# Patient Record
Sex: MALE | Race: WHITE | NOT HISPANIC OR LATINO | Employment: PART TIME | URBAN - METROPOLITAN AREA
[De-identification: names, ages, dates, MRNs, and addresses within clinical notes are randomized per-mention and may not be internally consistent; named-entity substitution may affect disease eponyms.]

---

## 2017-01-20 ENCOUNTER — OFFICE VISIT (OUTPATIENT)
Dept: FAMILY MEDICINE | Facility: CLINIC | Age: 22
End: 2017-01-20
Payer: COMMERCIAL

## 2017-01-20 VITALS
SYSTOLIC BLOOD PRESSURE: 128 MMHG | HEART RATE: 72 BPM | HEIGHT: 68 IN | DIASTOLIC BLOOD PRESSURE: 70 MMHG | BODY MASS INDEX: 20.45 KG/M2 | WEIGHT: 134.94 LBS | TEMPERATURE: 99 F

## 2017-01-20 DIAGNOSIS — G47.00 INSOMNIA, UNSPECIFIED TYPE: Primary | ICD-10-CM

## 2017-01-20 PROCEDURE — 99999 PR PBB SHADOW E&M-EST. PATIENT-LVL III: CPT | Mod: PBBFAC,,, | Performed by: FAMILY MEDICINE

## 2017-01-20 PROCEDURE — 99204 OFFICE O/P NEW MOD 45 MIN: CPT | Mod: S$GLB,,, | Performed by: FAMILY MEDICINE

## 2017-01-20 PROCEDURE — 1159F MED LIST DOCD IN RCRD: CPT | Mod: S$GLB,,, | Performed by: FAMILY MEDICINE

## 2017-01-20 RX ORDER — LORAZEPAM 0.5 MG/1
0.5 TABLET ORAL EVERY 6 HOURS PRN
Qty: 30 TABLET | Refills: 1 | Status: SHIPPED | OUTPATIENT
Start: 2017-01-20 | End: 2017-07-26 | Stop reason: SDUPTHER

## 2017-01-20 NOTE — MR AVS SNAPSHOT
[facility]  [address] [facility], [address]  [facility] [address]  Phone: [phone]  Fax: [phone]                  Renzo Worley   2017 3:40 PM   Office Visit    Description:  Male : 1995   Provider:  Allyssa Stallworth MD   Department:  [facility]           Reason for Visit     Anxiety     Sleeping Problem           Diagnoses this Visit        Comments    Insomnia, unspecified type    -  Primary            To Do List           Future Appointments        Provider Department Dept Phone    3/7/2017 3:00 PM Cynthia Carranza MD [facility] D[facility]atology [phone]      Goals (5 Years of Data)     None       These Medications        Disp Refills Start End    lorazepam (ATIVAN) 0.5 MG tablet 30 tablet 1 2017    Take 1 tablet (0.5 mg total) by mouth every 6 (six) hours as needed for Anxiety. - Oral    Pharmacy: [facility] [address] [place] [address] [address] Ph #: [phone]         [facility] On Call     [facility] On Call Nurse Care Line -  Assistance  Registered nurses in the [facility] On Call Center provide clinical advisement, health education, appointment booking, and other advisory services.  Call for this free service at [phone].             Medications           Message regarding Medications     Verify the changes and/or additions to your medication regime listed below are the same as discussed with your clinician today.  If any of these changes or additions are incorrect, please notify your healthcare provider.        START taking these NEW medications        Refills    lorazepam (ATIVAN) 0.5 MG tablet 1    Sig: Take 1 tablet (0.5 mg total) by mouth every 6 (six) hours as needed for Anxiety.    Class: Print    Route: Oral      STOP taking these medications     azithromycin (ZITHROMAX Z-JAVIER) 250 MG tablet Take 2 tabs po on day 1; then 1 po daily until completed  "   clonazepam (KLONOPIN) 0.5 MG tablet TAKE 1 TABLET BY MOUTH EVERY EVENING    eletriptan (RELPAX) 40 MG tablet Take 1 tablet by mouth for severe migraine, may repeat once in 2 hours if necessary, not to exceed 2 tablets a day    rizatriptan (MAXALT-MLT) 10 MG disintegrating tablet DISSOLVE 1 TABLET BY MOUTH AS DIRECTED    naproxen (NAPROSYN) 500 MG tablet Take 1 tablet (500 mg total) by mouth 2 (two) times daily with meals. 1 Tablet Oral Twice a day    promethazine (PHENERGAN) 12.5 MG tablet Take 1 tablet (12.5 mg total) by mouth 4 (four) times daily as needed for Nausea.    sertraline (ZOLOFT) 50 MG tablet Take 1/2 tablet daily for one week, then one daily           Verify that the below list of medications is an accurate representation of the medications you are currently taking.  If none reported, the list may be blank. If incorrect, please contact your healthcare provider. Carry this list with you in case of emergency.           Current Medications     lorazepam (ATIVAN) 0.5 MG tablet Take 1 tablet (0.5 mg total) by mouth every 6 (six) hours as needed for Anxiety.           Clinical Reference Information           Vital Signs - Last Recorded  Most recent update: 1/20/2017  3:40 PM by Alexia Pacheco MA    BP Pulse Temp    128/70 (BP Location: Left arm, Patient Position: Sitting, BP Method: Manual) 72 98.6 °F (37 °C) (Oral)    Ht Wt BMI    5' 7.75" (1.721 m) 61.2 kg (134 lb 14.7 oz) 20.67 kg/m2      Blood Pressure          Most Recent Value    BP  128/70      Allergies as of 1/20/2017     No Known Drug Allergies      Immunizations Administered on Date of Encounter - 1/20/2017     None      Smoking Cessation     If you would like to quit smoking:   You may be eligible for free services if you are a [place] resident and started smoking cigarettes before September 1, 1988.  Call the [employer]oking [employer]essation [employer] (S[employer]) toll free at (835) 775-8962 or (862) 727-9710.   Call 5-800-QUIT-NOW if you do not meet the " above criteria.

## 2017-01-23 NOTE — PROGRESS NOTES
Subjective:       Patient ID: Renzo Worley is a 21 y.o. male.    Chief Complaint: Anxiety and Sleeping Problem  pt working 2 jobs trying to make some career changes  Having difficulty sleeping at night. Sometimes doesn't  Sleep at all..   HPIsee above  Review of Systems   Constitutional: Positive for fatigue.   HENT: Negative.    Eyes: Negative.    Respiratory: Negative.    Cardiovascular: Negative.    Gastrointestinal: Negative.    Endocrine: Negative.    Genitourinary: Negative.    Musculoskeletal: Negative.    Allergic/Immunologic: Negative.    Neurological: Negative.    Hematological: Negative.    Psychiatric/Behavioral: Positive for sleep disturbance. The patient is nervous/anxious.        Objective:      Physical Exam   Constitutional: He is oriented to person, place, and time. He appears well-developed and well-nourished. No distress.   HENT:   Head: Normocephalic and atraumatic.   Right Ear: External ear normal.   Left Ear: External ear normal.   Nose: Nose normal.   Mouth/Throat: Oropharynx is clear and moist.   Eyes: Conjunctivae and EOM are normal. Pupils are equal, round, and reactive to light.   Neck: Normal range of motion. Neck supple. No JVD present. No tracheal deviation present. No thyromegaly present.   Cardiovascular: Normal rate, regular rhythm, normal heart sounds and intact distal pulses.  Exam reveals no gallop and no friction rub.    No murmur heard.  Pulmonary/Chest: Breath sounds normal. No respiratory distress. He has no wheezes. He has no rales. He exhibits no tenderness.   Abdominal: Soft. Bowel sounds are normal. He exhibits no distension and no mass. There is no tenderness.   Lymphadenopathy:     He has no cervical adenopathy.   Neurological: He is alert and oriented to person, place, and time. He has normal reflexes. He displays normal reflexes. No cranial nerve deficit. He exhibits normal muscle tone. Coordination normal.   Skin: Skin is warm and dry. No rash noted. He is not  diaphoretic. No erythema. No pallor.   Psychiatric: He has a normal mood and affect. His speech is normal and behavior is normal. Judgment and thought content normal. He is not actively hallucinating. Cognition and memory are normal. He is attentive.   Nursing note and vitals reviewed.      Assessment:       1. Insomnia, unspecified type     2.     Situational disturbance  Plan:     Pt counselled about abuse potential of meds.\   see med card dated 1-20-17  Recommend pt use every other day only.  Till sleep cycle improves.  Counseled x 20 minutes regarding med use and side effects

## 2017-07-26 ENCOUNTER — HOSPITAL ENCOUNTER (OUTPATIENT)
Dept: RADIOLOGY | Facility: HOSPITAL | Age: 22
Discharge: HOME | End: 2017-07-26
Attending: FAMILY MEDICINE
Payer: COMMERCIAL

## 2017-07-26 ENCOUNTER — OFFICE VISIT (OUTPATIENT)
Dept: SPORTS MEDICINE | Facility: CLINIC | Age: 22
End: 2017-07-26
Payer: COMMERCIAL

## 2017-07-26 VITALS — WEIGHT: 134 LBS | TEMPERATURE: 99 F | BODY MASS INDEX: 20.31 KG/M2 | HEIGHT: 68 IN

## 2017-07-26 DIAGNOSIS — M25.561 RIGHT KNEE PAIN, UNSPECIFIED CHRONICITY: ICD-10-CM

## 2017-07-26 DIAGNOSIS — M25.551 RIGHT HIP PAIN: ICD-10-CM

## 2017-07-26 DIAGNOSIS — M54.31 SCIATICA OF RIGHT SIDE: Primary | ICD-10-CM

## 2017-07-26 PROCEDURE — 99203 OFFICE O/P NEW LOW 30 MIN: CPT | Mod: S$GLB,,, | Performed by: FAMILY MEDICINE

## 2017-07-26 PROCEDURE — 73502 X-RAY EXAM HIP UNI 2-3 VIEWS: CPT | Mod: TC,PO,RT

## 2017-07-26 PROCEDURE — 99999 PR PBB SHADOW E&M-EST. PATIENT-LVL III: CPT | Mod: PBBFAC,,, | Performed by: FAMILY MEDICINE

## 2017-07-26 PROCEDURE — 73502 X-RAY EXAM HIP UNI 2-3 VIEWS: CPT | Mod: 26,RT,, | Performed by: RADIOLOGY

## 2017-07-26 NOTE — PROGRESS NOTES
Renzo Worley, a 22 y.o. male, is here for evaluation of RIGHT hip.     New patient.     HISTORY OF PRESENT ILLNESS   Location: anterior thigh, right  Onset: insidious, chronic  Palliative:    Relative rest   Oral analgesics     Provocative:   ADLs  Golf activities   Prior: none  Progression: plateau discomfort   Quality:    Sharp pain  Radiation: post thigh and leg  Severity: per nursing documentation  Timing: intermittent with use  Trauma: none     Review of systems (ROS):  A 10+ review of systems was performed with pertinent positives and negatives noted above in the history of present illness. Other systems were negative unless otherwise specified.      PHYSICAL EXAMINATION  General:  The patient is alert and oriented x 3.  Mood is pleasant.  Observation of ears, eyes and nose reveal no gross abnormalities.  HEENT: NCAT, sclera nonicteric  Lungs: Respirations are equal and unlabored.   Gait is coordinated. Patient can toe walk and heel walk without difficulty.    HIP/PELVIS EXAMINATION    Observation/Inspection  Gait:   Nonantalgic   Alignment:  Neutral   Scars:   None   Muscle atrophy: None   Effusion:  None   Warmth:  None   Discoloration:   None   Leg lengths:   Equal   Pelvis:   Level     Tenderness/Crepitus (T/C):      T / C  Trochanteric bursa   - / -  Piriformis    - / -  SI joint    - / -  Psoas tendon   - / -  Rectus insertion  - / -  Adductor insertion  - / -  Pubic symphysis  - / -    ROM: (* = pain)    Flexion:      120 degrees  External rotation:   40 degrees  Internal rotation with axial load:  30 degrees  Internal rotation without axial load:  40 degrees  Abduction:    45 degrees  Adduction:     20 degrees    Special Tests:  Pain w/ forced internal rotation (FADIR):  -   Pain w/ forced external rotation (IVY):  -   Circumduction test:     -  Stinchfield test:     -   Log roll:       -   Snapping hip (internal):    -   Sit-up pain:      -   Resisted sit-up pain:     -   Resisted sit-up with  adductor contraction pain:  -   Step-down test:     +  Trendelenburg test:     -  Bridge test      +     Extremity Neuro-vascular Examination:   Sensation:  Grossly intact to light touch all dermatomal regions.   Motor Function:  Fully intact motor function at hip, knee, foot and ankle    DTRs;  quadriceps and  achilles 2+.  No clonus and downgoing Babinski.    Vascular status:  DP and PT pulses 2+, brisk capillary refill, symmetric.    Skin:  intact, compartments soft.    Other Findings:    ASSESSMENT & PLAN  Assessment:   #1 overuse syndrome   W/ Sciatic neuropathy   W/ SI dysfunction, right    No evidence of vascular pathology    Imaging studies reviewed:   X-ray pelvis and hip, right 17.07    Plan:    We discussed the importance of appropriate diet, weight, and regular exercise including quadriceps strengthening     We discussed options including:  #1 watchful waiting  #2 physical therapy aimed at:   Core stability   RoM hip   Strengthening quadriceps   Gait training   #3 injection therapy:   CSI GTB    Right,     Left,    CSI iaHip    Right,     Left,    Orthobiologics   #4 MRI for further evaluation   #5 further evaluation of the lumbar spine     The patient chooses #2    Pain management: handout given  Bracing:   Physical therapy: fPT, @ [facility], begin as above  Activity (e.g. sports, work) restrictions: OOGolf until f/u   school/vocation: daryn work, semi pro golf    Follow up in 4-5 w  A/e fPT  Ineffective-->MRI pelvis vs. CSI SI right  Should symptoms worsen or fail to resolve, consider:  Revisiting the above options

## 2017-08-02 ENCOUNTER — CLINICAL SUPPORT (OUTPATIENT)
Dept: REHABILITATION | Facility: HOSPITAL | Age: 22
End: 2017-08-02
Attending: FAMILY MEDICINE
Payer: COMMERCIAL

## 2017-08-02 DIAGNOSIS — M54.31 RIGHT SIDED SCIATICA: ICD-10-CM

## 2017-08-02 DIAGNOSIS — M25.551 RIGHT HIP PAIN: Primary | ICD-10-CM

## 2017-08-02 PROCEDURE — 97161 PT EVAL LOW COMPLEX 20 MIN: CPT

## 2017-08-02 NOTE — PROGRESS NOTES
Physical Therapy Evaluation    Name: Renzo Worley  Clinic Number: [id number]      Medical Diagnosis:   Encounter Diagnoses   Name Primary?    Right hip pain Yes    Right sided sciatica      PT Diagnosis: R hip pain, R sided sciatica  Physician: Quentin Warren, *  Treatment Orders: PT Eval and Treat    Past Medical History:   Diagnosis Date    Concussion without loss of consciousness 2009    Right sided sciatica 8/2/2017     No current outpatient prescriptions on file.     No current facility-administered medications for this visit.      Review of patient's allergies indicates:   Allergen Reactions    No known drug allergies        Precautions: none    Evaluation Date: 08/02/2017  Visit # authorized: 1/20  Authorization period: 12/31/2017  Plan of care Expiration: 8/2/2017    Subjective   Onset/RAVINDER: gradual    Primary concern/ Chief complaints:    Renzo is a 22 y.o. male that presents to [facility] secondary to hip pain and sciatica on his R side. Reports that he was working in [employer], playing golf and overtraining at the gym. Reports that at work, he would lift 60lbs of materials up a flight of stairs repeatedly, which is a large percentage of his body weight. Pt reports that he has been stretching, but is still getting this pain. Pt has not had any injections to the hip. Pt was taking anti-inflammatories before he saw his MD but tries to go without. Injury/surgery occurred on 2 months ago. Reports that he has had some worsening in his pain, but the last 3 days have been pretty good.  X-ray was taken and revealed no abnormalities. Previous treatment included none. Pt reports that overuse increases right hip pain and reports right hip pain at worst is a 6 on the VAS. Pt uses rest, ice, stretching to control right hip pain symptoms. Pt has a decrease ability to perform ADLs such as playing golf, going up and down stairs, long duration standing, running. 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 Pt also plays semi-pro golf and is unable to participate fully due to hip pain with his swing. Pt reports mild intermittent numbness and tingling in the R hip and hamstring region. No cultural, environmental, or spiritual barriers identified to treatment or learning.    Occupation: [employer], [employer]  Pain Scale: Renzo rates pain on a scale of 0-10 to be 6 at worst; 1 currently; n/a at best .    Patient Goals: Pt would like to decrease pain and increase function so he can return to pain-free completion of normal daily activities.      Objective     Observation: ambulates independently    Posture: WNL    Hip Range of Motion:   Left Active Left Passive Right Active  Right Passive NORM   Flexion 130 135 130 135  100-120 deg   Abduction 50 50 50 50  30-50 deg   Extension 12 15 12 15  10-15 deg   Ext. Rotation 45 50 45 50  40-60 deg   Int. Rotation 30 35 30 35  30-40 deg     Lower Extremity Strength  Left LE  Right LE    Knee extension: 5/5 Knee extension:  *pain in R low back 4+/5   Knee flexion: 5/5 Knee flexion: 5/5   Hip flexion: 5/5 Hip flexion: 5/5   Hip extension:  4+/5 Hip extension: 4+/5   Hip abduction: 5/5 Hip abduction: 5/5   Hip adduction: 5/5 Hip adduction 5/5   Ankle dorsiflexion: 5/5 Ankle dorsiflexion: 5/5   Ankle plantarflexion: 5/5 Ankle plantarflexion: 5/5         Special Tests:   Bridge Test: -    Flexibility: decreased in R hamstrings    Popliteal Angle: R = -50 degrees ; L = -15 degrees [ norm 0 to -20 degrees ]   Flexion-adduction test: + able to cross over midline    Joint Mobility: WNL B hips   Clicking with PROM?: no    Palpation: ttp to R piriformis    Sensation: WNL    Edema: none    Outcome measures:   FOTO hip: 37% impaired    G-code Current:   (20% < 40%) Impaired, limited, or restricted    G-code Goal:   (20% < 40%) Impaired, Limited, or restricted    PT Evaluation Completed?  Yes  Discussed Plan of Care with patient: Yes    TREATMENT:  Renzo received therapeutic exercises to develop strength and endurance, flexibility for 15 minutes including: Hamstring stretches, piriformis stretches, planks and side planks (HEP2go code = [id number])    Instructed pt. Regarding: Proper technique with all exercises. Pt demo good understanding of the education provided. Renzo demonstrated good return demonstration of activities.  Pt/family was provided educational information, including: role of PT, goals for PT, scheduling - pt verbalized understanding. Discussed insurance limitations with pt.     Pt has no cultural, educational or language barriers to learning provided.    Assessment     This is a 22 y.o. male referred to outpatient physical therapy and presents with a medical diagnosis of right hip pain and demonstrates limitations as described in the problem list. Pt will benefit from physcial therapy services in order to maximize pain free and/or functional use of right hip. The following goals were discussed with the patient and patient is in agreement with them as to be addressed in the treatment plan. Pt was given a HEP consisting of hamstring stretches, piriformis stretches, planks and side planks (HEP2go code = [id number]). Pt verbally understood the instructions as they were given and demonstrated proper form and technique during therapy. Pt was advise to perform these exercises free of pain, and to stop performing them if pain occurs.     Patient History Examination Clinical Presentation Clinical Decision Making   Comorbidities:  none    Personal Factors:  none       Activity and Participation Restriction:  Mobility  General tasks and demands    Body Systems:  Musculoskeletal    Body Regions:  Lower Extremities Stable and uncomplicated   Low            Medical necessity is demonstrated by the following IMPAIRMENTS/PROBLEM LIST:   1)Increase in pain level limiting function   2)Decreased range of motion  limiting function   3)Decreased strength limiting function   4)Lack of HEP    Anticipated barriers to physical therapy: none    Pt's spiritual, cultural and educational needs considered and pt agreeable to plan of care and goals as stated below:     GOALS: Short Term Goals:  3 weeks  1. Report decreased right hip pain  <   / =  0  /10  to increase tolerance for completion of ADLs  2. Increased MMT for hip flexion to 5/5  to increase tolerance for ADL and work activities.  3. Increased MMT for hip extension to 5/5 to increase tolerance for ADL and work activities.  4. Pt to report a decreased amount of difficulty with playing golf in order to demonstrate improvements in ADL tolerance  5. Pt to tolerate HEP to improve ROM and independence with ADL's    Long Term Goals: 6 weeks  1. Demonstrate within 20 degrees of 0 on HS flexibility testing in order to demonstrate improvements in R HS flexibility and AROM  2. Increased MMT  for hip abduction 5/5 to increase tolerance for ADL and work activities.  3. Demonstrate equal hip flexion in standing in order to demonstrate improvements in functional AROM.  4. Pt will report at CJ level (20-40% impaired) on FOTO to demonstrate increase in LE function with every day tasks.   5. Pt to be Independent with HEP to improve ROM and independence with ADL's      Plan     Pt will be treated by physical therapy 1-3 times a week for 6 weeks for Pt Education, HEP, therapeutic exercises, neuromuscular re-education, joint mobilizations, modalities prn to achieve established goals. Renzo may at times be seen by a PTA as part of the Rehab Team.     Cont PT for 6 weeks.     Micaela Avalos, ALAN  08/02/2017    I certify the need for these services furnished under this plan of treatment and while under my care.    ______________________________ Physician/Referring Practitioner  Date of Signature

## 2017-08-03 ENCOUNTER — PATIENT MESSAGE (OUTPATIENT)
Dept: SPORTS MEDICINE | Facility: CLINIC | Age: 22
End: 2017-08-03

## 2017-08-03 DIAGNOSIS — M54.30 SCIATICA, UNSPECIFIED LATERALITY: Primary | ICD-10-CM

## 2017-08-04 ENCOUNTER — TELEPHONE (OUTPATIENT)
Dept: SPORTS MEDICINE | Facility: CLINIC | Age: 22
End: 2017-08-04

## 2017-08-04 RX ORDER — MELOXICAM 15 MG/1
15 TABLET ORAL DAILY
Qty: 15 TABLET | Refills: 0 | Status: SHIPPED | OUTPATIENT
Start: 2017-08-04 | End: 2017-10-09 | Stop reason: SDUPTHER

## 2017-08-04 NOTE — TELEPHONE ENCOUNTER
----- Message from Zuly Martinez sent at 8/4/2017  9:27 AM CDT -----  Contact: [email or web address]#[phone]  Patient mother would like him to be seen today patient is having right hip pain.

## 2017-08-04 NOTE — TELEPHONE ENCOUNTER
Spoke c pt's mother.     Informed pt's mother Dr. Warrne is out of the office today.     Pt's mother stated she needs to be with pt at next visit.     Scheduled pt 08/07/17 @ 9:30.     Informed pt's mother Dr. Warren has sent meloxicam to pharmacy.     Pt's mother stated she has a rx for meloxicam & has been giving it to pt & pt has not had relief. Pt's mother requested rx for flexeril & another medication.     Informed pt's mother those medications are not something prescribed by Dr. Warren.     Pt's mother understood.

## 2017-08-04 NOTE — TELEPHONE ENCOUNTER
Left voicemail.     Instructed pt to return call to [facility].     ==    Meloxicam & referral to spine clinic ordered by Dr. Warren.

## 2017-08-04 NOTE — PLAN OF CARE
Physical Therapy Evaluation    Name: Renzo Worley  Clinic Number: [id number]      Medical Diagnosis:   Encounter Diagnoses   Name Primary?    Right hip pain Yes    Right sided sciatica      PT Diagnosis: R hip pain, R sided sciatica  Physician: Quentin Warren, *  Treatment Orders: PT Eval and Treat    Past Medical History:   Diagnosis Date    Concussion without loss of consciousness 2009    Right sided sciatica 8/2/2017     No current outpatient prescriptions on file.     No current facility-administered medications for this visit.      Review of patient's allergies indicates:   Allergen Reactions    No known drug allergies        Precautions: none    Evaluation Date: 08/02/2017  Visit # authorized: 1/20  Authorization period: 12/31/2017  Plan of care Expiration: 8/2/2017    Subjective   Onset/RAVINDER: gradual    Primary concern/ Chief complaints:    Renzo is a 22 y.o. male that presents to [facility] secondary to hip pain and sciatica on his R side. Reports that he was working in [employer], playing golf and overtraining at the gym. Reports that at work, he would lift 60lbs of materials up a flight of stairs repeatedly, which is a large percentage of his body weight. Pt reports that he has been stretching, but is still getting this pain. Pt has not had any injections to the hip. Pt was taking anti-inflammatories before he saw his MD but tries to go without. Injury/surgery occurred on 2 months ago. Reports that he has had some worsening in his pain, but the last 3 days have been pretty good.  X-ray was taken and revealed no abnormalities. Previous treatment included none. Pt reports that overuse increases right hip pain and reports right hip pain at worst is a 6 on the VAS. Pt uses rest, ice, stretching to control right hip pain symptoms. Pt has a decrease ability to perform ADLs such as playing golf, going up and down stairs, long duration standing, running. 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 Pt also plays semi-pro golf and is unable to participate fully due to hip pain with his swing. Pt reports mild intermittent numbness and tingling in the R hip and hamstring region. No cultural, environmental, or spiritual barriers identified to treatment or learning.    Occupation: [employer]  Pain Scale: Renzo rates pain on a scale of 0-10 to be 6 at worst; 1 currently; n/a at best .    Patient Goals: Pt would like to decrease pain and increase function so he can return to pain-free completion of normal daily activities.      Objective     Observation: ambulates independently    Posture: WNL    Hip Range of Motion:   Left Active Left Passive Right Active  Right Passive NORM   Flexion 130 135 130 135  100-120 deg   Abduction 50 50 50 50  30-50 deg   Extension 12 15 12 15  10-15 deg   Ext. Rotation 45 50 45 50  40-60 deg   Int. Rotation 30 35 30 35  30-40 deg     Lower Extremity Strength  Left LE  Right LE    Knee extension: 5/5 Knee extension:  *pain in R low back 4+/5   Knee flexion: 5/5 Knee flexion: 5/5   Hip flexion: 5/5 Hip flexion: 5/5   Hip extension:  4+/5 Hip extension: 4+/5   Hip abduction: 5/5 Hip abduction: 5/5   Hip adduction: 5/5 Hip adduction 5/5   Ankle dorsiflexion: 5/5 Ankle dorsiflexion: 5/5   Ankle plantarflexion: 5/5 Ankle plantarflexion: 5/5         Special Tests:   Bridge Test: -    Flexibility: decreased in R hamstrings    Popliteal Angle: R = -50 degrees ; L = -15 degrees [ norm 0 to -20 degrees ]   Flexion-adduction test: + able to cross over midline    Joint Mobility: WNL B hips   Clicking with PROM?: no    Palpation: ttp to R piriformis    Sensation: WNL    Edema: none    Outcome measures:   FOTO hip: 37% impaired    G-code Current:   (20% < 40%) Impaired, limited, or restricted    G-code Goal:   (20% < 40%) Impaired, Limited, or restricted    PT Evaluation Completed?  Yes  Discussed Plan of Care with patient: Yes    TREATMENT:  Renzo received therapeutic exercises to develop strength and endurance, flexibility for 15 minutes including: Hamstring stretches, piriformis stretches, planks and side planks (HEP2go code = [id number])    Instructed pt. Regarding: Proper technique with all exercises. Pt demo good understanding of the education provided. Renzo demonstrated good return demonstration of activities.  Pt/family was provided educational information, including: role of PT, goals for PT, scheduling - pt verbalized understanding. Discussed insurance limitations with pt.     Pt has no cultural, educational or language barriers to learning provided.    Assessment     This is a 22 y.o. male referred to outpatient physical therapy and presents with a medical diagnosis of right hip pain and demonstrates limitations as described in the problem list. Pt will benefit from physcial therapy services in order to maximize pain free and/or functional use of right hip. The following goals were discussed with the patient and patient is in agreement with them as to be addressed in the treatment plan. Pt was given a HEP consisting of hamstring stretches, piriformis stretches, planks and side planks (HEP2go code = [id number]). Pt verbally understood the instructions as they were given and demonstrated proper form and technique during therapy. Pt was advise to perform these exercises free of pain, and to stop performing them if pain occurs.     Patient History Examination Clinical Presentation Clinical Decision Making   Comorbidities:  none    Personal Factors:  none       Activity and Participation Restriction:  Mobility  General tasks and demands    Body Systems:  Musculoskeletal    Body Regions:  Lower Extremities Stable and uncomplicated   Low            Medical necessity is demonstrated by the following IMPAIRMENTS/PROBLEM LIST:   1)Increase in pain level limiting function   2)Decreased range of motion  limiting function   3)Decreased strength limiting function   4)Lack of HEP    Anticipated barriers to physical therapy: none    Pt's spiritual, cultural and educational needs considered and pt agreeable to plan of care and goals as stated below:     GOALS: Short Term Goals:  3 weeks  1. Report decreased right hip pain  <   / =  0  /10  to increase tolerance for completion of ADLs  2. Increased MMT for hip flexion to 5/5  to increase tolerance for ADL and work activities.  3. Increased MMT for hip extension to 5/5 to increase tolerance for ADL and work activities.  4. Pt to report a decreased amount of difficulty with playing golf in order to demonstrate improvements in ADL tolerance  5. Pt to tolerate HEP to improve ROM and independence with ADL's    Long Term Goals: 6 weeks  1. Demonstrate within 20 degrees of 0 on HS flexibility testing in order to demonstrate improvements in R HS flexibility and AROM  2. Increased MMT  for hip abduction 5/5 to increase tolerance for ADL and work activities.  3. Demonstrate equal hip flexion in standing in order to demonstrate improvements in functional AROM.  4. Pt will report at CJ level (20-40% impaired) on FOTO to demonstrate increase in LE function with every day tasks.   5. Pt to be Independent with HEP to improve ROM and independence with ADL's      Plan     Pt will be treated by physical therapy 1-3 times a week for 6 weeks for Pt Education, HEP, therapeutic exercises, neuromuscular re-education, joint mobilizations, modalities prn to achieve established goals. Renzo may at times be seen by a PTA as part of the Rehab Team.     Cont PT for 6 weeks.     Micaela Avalos, ALAN  08/02/2017    I certify the need for these services furnished under this plan of treatment and while under my care.    ______________________________ Physician/Referring Practitioner  Date of Signature

## 2017-08-28 ENCOUNTER — PATIENT MESSAGE (OUTPATIENT)
Dept: SPORTS MEDICINE | Facility: CLINIC | Age: 22
End: 2017-08-28

## 2017-09-18 ENCOUNTER — PATIENT MESSAGE (OUTPATIENT)
Dept: FAMILY MEDICINE | Facility: CLINIC | Age: 22
End: 2017-09-18

## 2017-09-19 ENCOUNTER — OFFICE VISIT (OUTPATIENT)
Dept: FAMILY MEDICINE | Facility: CLINIC | Age: 22
End: 2017-09-19
Payer: COMMERCIAL

## 2017-09-19 VITALS
SYSTOLIC BLOOD PRESSURE: 120 MMHG | HEART RATE: 62 BPM | HEIGHT: 69 IN | WEIGHT: 132.06 LBS | DIASTOLIC BLOOD PRESSURE: 68 MMHG | BODY MASS INDEX: 19.56 KG/M2

## 2017-09-19 DIAGNOSIS — F41.9 ANXIETY: ICD-10-CM

## 2017-09-19 DIAGNOSIS — G89.29 CHRONIC RIGHT-SIDED LOW BACK PAIN WITH RIGHT-SIDED SCIATICA: Primary | ICD-10-CM

## 2017-09-19 DIAGNOSIS — M54.41 CHRONIC RIGHT-SIDED LOW BACK PAIN WITH RIGHT-SIDED SCIATICA: Primary | ICD-10-CM

## 2017-09-19 PROCEDURE — 99999 PR PBB SHADOW E&M-EST. PATIENT-LVL III: CPT | Mod: PBBFAC,,, | Performed by: FAMILY MEDICINE

## 2017-09-19 PROCEDURE — 3008F BODY MASS INDEX DOCD: CPT | Mod: S$GLB,,, | Performed by: FAMILY MEDICINE

## 2017-09-19 PROCEDURE — 96372 THER/PROPH/DIAG INJ SC/IM: CPT | Mod: S$GLB,,, | Performed by: FAMILY MEDICINE

## 2017-09-19 PROCEDURE — 99214 OFFICE O/P EST MOD 30 MIN: CPT | Mod: 25,S$GLB,, | Performed by: FAMILY MEDICINE

## 2017-09-19 RX ORDER — CHLORZOXAZONE 500 MG/1
500 TABLET ORAL 4 TIMES DAILY PRN
Qty: 30 TABLET | Refills: 3 | Status: SHIPPED | OUTPATIENT
Start: 2017-09-19 | End: 2017-09-29

## 2017-09-19 RX ORDER — KETOROLAC TROMETHAMINE 30 MG/ML
15 INJECTION, SOLUTION INTRAMUSCULAR; INTRAVENOUS EVERY 6 HOURS
Qty: 0.5 ML | Refills: 0 | Status: SHIPPED | OUTPATIENT
Start: 2017-09-19 | End: 2017-09-19

## 2017-09-19 RX ORDER — SERTRALINE HYDROCHLORIDE 50 MG/1
50 TABLET, FILM COATED ORAL DAILY
Qty: 30 TABLET | Refills: 11 | Status: ON HOLD | OUTPATIENT
Start: 2017-09-19 | End: 2017-11-20

## 2017-09-19 RX ORDER — KETOROLAC TROMETHAMINE 30 MG/ML
15 INJECTION, SOLUTION INTRAMUSCULAR; INTRAVENOUS
Status: DISCONTINUED | OUTPATIENT
Start: 2017-09-19 | End: 2017-09-19

## 2017-09-19 RX ORDER — KETOROLAC TROMETHAMINE 30 MG/ML
30 INJECTION, SOLUTION INTRAMUSCULAR; INTRAVENOUS
Status: COMPLETED | OUTPATIENT
Start: 2017-09-19 | End: 2017-09-19

## 2017-09-19 RX ADMIN — KETOROLAC TROMETHAMINE 30 MG: 30 INJECTION, SOLUTION INTRAMUSCULAR; INTRAVENOUS at 11:09

## 2017-09-19 NOTE — PROGRESS NOTES
Two patient identifiers used, allergies reviewed,  order verified and administered to . Toradol 15 mg administered as ordered. Administered to LUOQ gluteus. Pt tolerated injection well, no swelling, redness, or bruising noted at injection site. Pt advised to remain in clinic 15 minutes following injection for observation, verbalizes understanding.

## 2017-09-19 NOTE — PROGRESS NOTES
Subjective:       Patient ID: Renzo Worley is a 22 y.o. male.    Chief Complaint: Anxiety and Hip Pain (muscular)    Disclaimer: This note has been generated using voice-recognition software. There may be typographical errors that have been missed during proof-reading    21 yo presents today for evaluation of recurrent low back pain, present for over six months.  Pain is localized to right lower back with radiation to outer aspect of right leg.  Pain is about7/10, presently taking Flexeril qhs and Mobic with minimal relief.  He denies associated weakness or numbness.  Pt has been evaluated by both Sports Medicine and outside Chiropractor  He is also here with recurrent symptoms of anxiety and depression, which he blames on his low back pain.  He is unable to pursue activities that he enjoys.  He is presently playing golf and lifting weights.  He also has to lift heavy bags at work.        Anxiety   Symptoms include nervous/anxious behavior. Patient reports no suicidal ideas.       Hip Pain    Pertinent negatives include no numbness.     Review of Systems   Constitutional: Negative for activity change, fatigue and unexpected weight change.   Musculoskeletal: Positive for back pain. Negative for gait problem and neck pain.   Neurological: Negative for weakness and numbness.   Psychiatric/Behavioral: Negative for agitation, self-injury, sleep disturbance and suicidal ideas. The patient is nervous/anxious.        Objective:      Physical Exam   Constitutional: He appears well-developed and well-nourished. No distress.   Musculoskeletal:   Tender to palpation over right lumbar spine at about L4-L5  positive SLR on right   Neurological:   Reflex Scores:       Patellar reflexes are 0 on the right side and 0 on the left side.       Achilles reflexes are 2+ on the right side and 2+ on the left side.  Psychiatric: His speech is normal and behavior is normal. Judgment and thought content normal. His mood appears anxious. His  affect is not angry, not blunt and not inappropriate. Cognition and memory are normal. He expresses no suicidal ideation.       Assessment:       1. Chronic right-sided low back pain with right-sided sciatica    2. Anxiety        Plan:       1.  Lumbar MRI, consult Pain Management  2.  May continue Flexeril qhs, Parfon Forte as needed during day  3.  Continue Mobic, Toradol IM today  4.  Start Zoloft with f/u in 2-3 weeks

## 2017-09-20 ENCOUNTER — TELEPHONE (OUTPATIENT)
Dept: PAIN MEDICINE | Facility: CLINIC | Age: 22
End: 2017-09-20

## 2017-09-20 NOTE — TELEPHONE ENCOUNTER
Contacted and left message for pt regarding appointment on 9/20/17. Pt was informed via voicemail Dr. Rivas has a  Surgery scheduled for 12:30 , and unfortunately the appointment was scheduled in error.

## 2017-09-23 ENCOUNTER — HOSPITAL ENCOUNTER (OUTPATIENT)
Dept: RADIOLOGY | Facility: HOSPITAL | Age: 22
Discharge: HOME | End: 2017-09-23
Attending: FAMILY MEDICINE
Payer: COMMERCIAL

## 2017-09-23 DIAGNOSIS — G89.29 CHRONIC RIGHT-SIDED LOW BACK PAIN WITH RIGHT-SIDED SCIATICA: ICD-10-CM

## 2017-09-23 DIAGNOSIS — M54.41 CHRONIC RIGHT-SIDED LOW BACK PAIN WITH RIGHT-SIDED SCIATICA: ICD-10-CM

## 2017-09-23 PROCEDURE — 72148 MRI LUMBAR SPINE W/O DYE: CPT | Mod: 26,,, | Performed by: RADIOLOGY

## 2017-09-23 PROCEDURE — 72148 MRI LUMBAR SPINE W/O DYE: CPT | Mod: TC

## 2017-09-26 ENCOUNTER — OFFICE VISIT (OUTPATIENT)
Dept: PAIN MEDICINE | Facility: CLINIC | Age: 22
End: 2017-09-26
Attending: ANESTHESIOLOGY
Payer: COMMERCIAL

## 2017-09-26 VITALS
HEIGHT: 69 IN | TEMPERATURE: 98 F | BODY MASS INDEX: 18.77 KG/M2 | WEIGHT: 126.75 LBS | OXYGEN SATURATION: 99 % | DIASTOLIC BLOOD PRESSURE: 72 MMHG | RESPIRATION RATE: 18 BRPM | HEART RATE: 74 BPM | SYSTOLIC BLOOD PRESSURE: 112 MMHG

## 2017-09-26 DIAGNOSIS — M47.26 OSTEOARTHRITIS OF SPINE WITH RADICULOPATHY, LUMBAR REGION: Primary | ICD-10-CM

## 2017-09-26 DIAGNOSIS — M51.26 HERNIATED LUMBAR INTERVERTEBRAL DISC: ICD-10-CM

## 2017-09-26 PROCEDURE — 99245 OFF/OP CONSLTJ NEW/EST HI 55: CPT | Mod: S$GLB,,, | Performed by: ANESTHESIOLOGY

## 2017-09-26 PROCEDURE — 99999 PR PBB SHADOW E&M-EST. PATIENT-LVL III: CPT | Mod: PBBFAC,,, | Performed by: ANESTHESIOLOGY

## 2017-09-26 NOTE — PROGRESS NOTES
Subjective:      Patient ID: Renzo Worley is a 22 y.o. male.    Chief Complaint: Back Pain (Dr. Area)    Referred by: Rene Monreal MD     Pain Scales  Best:/10  Worst:  Usually:  Today: 7/10    Back Pain   Pertinent negatives include no chest pain, fever, headaches or weight loss.     This is a consult for right buttock area radiating to the right lower extremity.  He is a 22-year-old male who has been suffering with right buttock and right lower extremity pain for approximately 6 months.  His recently worsened significantly.  He has tried physical therapy 6 weeks ago without significant help.  Chiropractic manipulation helps for about 1 day.  He did not receive any epidural injections but total helped him temporarily when given once before.  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  He also plays golf.  All of these activities seem to aggravate the pain.  He denied having any bowel or bladder incontinence but he said this uncomfortable to have a bowel movement as it aggravates his pain.  Sitting and driving aggravates his pain.  His pain is in a classic S1 distribution.  Pain is a dull ache and sometimes sharp.  He had a new MRI done a few days ago showed herniated disc at the L5-S1.  We discussed the images with the patient and his mother.    Past Medical History:   Diagnosis Date    Concussion without loss of consciousness 2009    Right sided sciatica 8/2/2017       History reviewed. No pertinent surgical history.    Review of patient's allergies indicates:   Allergen Reactions    No known drug allergies        Current Outpatient Prescriptions   Medication Sig Dispense Refill    chlorzoxazone (PARAFON FORTE) 500 mg Tab Take 1 tablet (500 mg total) by mouth 4 (four) times daily as needed. 30 tablet 3    meloxicam (MOBIC) 15 MG tablet Take 1 tablet (15 mg total) by mouth once daily. 15 tablet 0    sertraline (ZOLOFT) 50 MG tablet Take 1 tablet (50 mg total) by mouth  once daily. 30 tablet 11     No current facility-administered medications for this visit.        Family History   Problem Relation Age of Onset    Hypertension Mother     Migraines Mother     Arthritis Mother     Migraines Sister     Migraines Brother     Multiple sclerosis Maternal Uncle     Cerebral palsy Paternal Aunt     Stroke Maternal Grandfather     Cancer Maternal Grandfather     Cancer Paternal Grandmother     Coronary artery disease Paternal Grandfather     Ulcers Paternal Grandfather        Social History     Social History    Marital status: Single     Spouse name: N/A    Number of children: N/A    Years of education: N/A     Occupational History    Not on file.     Social History Main Topics    Smoking status: Current Every Day Smoker     Packs/day: 1.00     Types: Cigarettes    Smokeless tobacco: Never Used    Alcohol use 0.6 oz/week     1 Shots of liquor per week      Comment: occasional    Drug use: No    Sexual activity: Yes     Other Topics Concern    Not on file     Social History Narrative    No narrative on file           Review of Systems   Constitution: Negative for chills, fever, malaise/fatigue, weight gain and weight loss.   HENT: Negative for ear pain and hoarse voice.    Eyes: Negative for blurred vision, pain and visual disturbance.   Cardiovascular: Negative for chest pain, dyspnea on exertion and irregular heartbeat.   Respiratory: Negative for cough, shortness of breath and wheezing.    Endocrine: Negative for cold intolerance and heat intolerance.   Hematologic/Lymphatic: Negative for adenopathy and bleeding problem. Does not bruise/bleed easily.   Skin: Negative for color change, itching and rash.   Musculoskeletal: Positive for back pain.   Gastrointestinal: Negative for change in bowel habit, diarrhea, hematemesis, hematochezia, melena and vomiting.   Genitourinary: Negative for flank pain, frequency, hematuria and urgency.   Neurological: Negative for  "difficulty with concentration, dizziness, headaches, loss of balance and seizures.   Psychiatric/Behavioral: Negative for altered mental status, depression and suicidal ideas. The patient is not nervous/anxious.    Allergic/Immunologic: Negative for HIV exposure.           Objective:      /72   Pulse 74   Temp 98.2 °F (36.8 °C) (Oral)   Resp 18   Ht 5' 8.5" (1.74 m)   Wt 57.5 kg (126 lb 12.2 oz)   SpO2 99%   BMI 18.99 kg/m²   Normocephalic.  Atraumatic.  Affect appropriate.  Breathing unlabored.  Extra ocular muscles intact.          General Musculoskeletal Exam   Gait: normal     Right Ankle/Foot Exam     Tests   Heel Walk: able to perform  Tiptoe Walk: able to perform    Left Ankle/Foot Exam     Tests   Heel Walk: able to perform  Tiptoe Walk: able to perform      Right Hip Exam     Range of Motion   Internal Rotation: normal   External Rotation: normal     Tests   Pain w/ forced internal rotation (IVY): absent  Zina: negative    Other   Sensation: normal  Left Hip Exam     Range of Motion   Internal Rotation: normal   External Rotation: normal     Tests   Pain w/ forced internal rotation (IVY): absent  Zina: negative    Other   Sensation: normal      Back (L-Spine & T-Spine) / Neck (C-Spine) Exam     Back (L-Spine & T-Spine) Range of Motion   Extension: normal   Flexion: abnormal Back flexion: Precipitates pain down the right lower extremity in an S1 distribution starting at the buttock.     Back (L-Spine & T-Spine) Tests   Right Side Tests  Femoral Stretch: negative  Left Side Tests  Femoral Stretch: negative    Comments:  FACET LOADING: negative.  Positive straight leg raise on the right side.  Positive pain with lumbar flexion down the right buttock to the right lower extremity in an S1 distribution.        Muscle Strength   Right Lower Extremity   Hip Flexion: 5/5   Quadriceps:  5/5   Hamstrin/5   Gastrocsoleus:  5/5/5  EHL:  5/5  Left Lower Extremity   Hip Flexion: 5/5   Quadriceps:  " 5/5   Hamstrin/5   Gastrocsoleus:  5/5/5  EHL:  5/5    Reflexes     Left Side  Quadriceps:  2+  Achilles:  2+  Babinski Sign:  absent  Ankle Clonus:  absent    Right Side   Quadriceps:  2+  Achilles:  1+  Babinski Sign:  absent  Ankle Clonus:  absent    Vascular Exam     Right Pulses  Dorsalis Pedis:      2+          Left Pulses  Dorsalis Pedis:      2+          Capillary Refill  Right Hand: normal capillary refill  Left Hand: normal capillary refill    Edema  Right Upper Leg: absent  Left Upper Leg: absent        Assessment:       Encounter Diagnoses   Name Primary?    Osteoarthritis of spine with radiculopathy, lumbar region Yes    Herniated lumbar intervertebral disc          Plan:       Renzo was seen today for back pain.    Diagnoses and all orders for this visit:    Osteoarthritis of spine with radiculopathy, lumbar region    Herniated lumbar intervertebral disc         We discussed with the patient the assessment and recommendations. The following is the plan we agreed on:  1.   regarding smoking.  2.  Schedule patient for right L5 and S1 transforaminal epidural steroid injection under fluoroscopy.  3.  Return as needed.  Otherwise follow-up 2 weeks after procedure.  I counseled patient regarding activities.  Consider home traction/inversion table.

## 2017-09-26 NOTE — LETTER
September 26, 2017      Rene Monreal MD  [address] [facility]  [address]  [facility] [address]           [facility]  [address]  [facility] [address]  Phone: [phone]  Fax: [phone]          Patient: Renzo Worley   MR Number: [id number]   YOB: 1995   Date of Visit: 9/26/2017       Dear Dr. Rene Monreal:    Thank you for referring Renzo Worley to me for evaluation. Attached you will find relevant portions of my assessment and plan of care.    If you have questions, please do not hesitate to call me. I look forward to following Renzo Worley along with you.    Sincerely,    Bob Addison MD    Enclosure  CC:  No Recipients    If you would like to receive this communication electronically, please contact [email or web address] or [phone] to request more information on [employer] access.    For providers and/or their staff who would like to refer a patient to [facility], please contact us through our one-stop-shop provider referral line, [facility], at [phone].    If you feel you have received this communication in error or would no longer like to receive these types of communications, please e-mail [email or web address]

## 2017-10-05 ENCOUNTER — SURGERY (OUTPATIENT)
Age: 22
End: 2017-10-05

## 2017-10-05 ENCOUNTER — HOSPITAL ENCOUNTER (OUTPATIENT)
Facility: OTHER | Age: 22
Discharge: HOME | End: 2017-10-05
Attending: ANESTHESIOLOGY | Admitting: ANESTHESIOLOGY
Payer: COMMERCIAL

## 2017-10-05 VITALS
HEIGHT: 68 IN | OXYGEN SATURATION: 93 % | DIASTOLIC BLOOD PRESSURE: 55 MMHG | BODY MASS INDEX: 19.1 KG/M2 | SYSTOLIC BLOOD PRESSURE: 107 MMHG | RESPIRATION RATE: 16 BRPM | HEART RATE: 73 BPM | TEMPERATURE: 99 F | WEIGHT: 126 LBS

## 2017-10-05 DIAGNOSIS — G89.29 CHRONIC PAIN: ICD-10-CM

## 2017-10-05 DIAGNOSIS — M54.16 LUMBAR RADICULOPATHY: Primary | ICD-10-CM

## 2017-10-05 PROCEDURE — 64483 NJX AA&/STRD TFRM EPI L/S 1: CPT | Performed by: ANESTHESIOLOGY

## 2017-10-05 PROCEDURE — 99152 MOD SED SAME PHYS/QHP 5/>YRS: CPT | Mod: ,,, | Performed by: ANESTHESIOLOGY

## 2017-10-05 PROCEDURE — 25000003 PHARM REV CODE 250: Performed by: ANESTHESIOLOGY

## 2017-10-05 PROCEDURE — 25000003 PHARM REV CODE 250: Performed by: PHYSICAL MEDICINE & REHABILITATION

## 2017-10-05 PROCEDURE — 64484 NJX AA&/STRD TFRM EPI L/S EA: CPT | Performed by: ANESTHESIOLOGY

## 2017-10-05 PROCEDURE — 64484 NJX AA&/STRD TFRM EPI L/S EA: CPT | Mod: RT,,, | Performed by: ANESTHESIOLOGY

## 2017-10-05 PROCEDURE — 25500020 PHARM REV CODE 255: Performed by: ANESTHESIOLOGY

## 2017-10-05 PROCEDURE — 63600175 PHARM REV CODE 636 W HCPCS: Performed by: ANESTHESIOLOGY

## 2017-10-05 PROCEDURE — 64483 NJX AA&/STRD TFRM EPI L/S 1: CPT | Mod: RT,,, | Performed by: ANESTHESIOLOGY

## 2017-10-05 RX ORDER — FENTANYL CITRATE 50 UG/ML
INJECTION, SOLUTION INTRAMUSCULAR; INTRAVENOUS
Status: DISCONTINUED | OUTPATIENT
Start: 2017-10-05 | End: 2017-10-05 | Stop reason: HOSPADM

## 2017-10-05 RX ORDER — MIDAZOLAM HYDROCHLORIDE 1 MG/ML
INJECTION INTRAMUSCULAR; INTRAVENOUS
Status: DISCONTINUED | OUTPATIENT
Start: 2017-10-05 | End: 2017-10-05 | Stop reason: HOSPADM

## 2017-10-05 RX ORDER — LIDOCAINE HYDROCHLORIDE 10 MG/ML
INJECTION, SOLUTION EPIDURAL; INFILTRATION; INTRACAUDAL; PERINEURAL
Status: DISCONTINUED | OUTPATIENT
Start: 2017-10-05 | End: 2017-10-05 | Stop reason: HOSPADM

## 2017-10-05 RX ORDER — SODIUM CHLORIDE 9 MG/ML
500 INJECTION, SOLUTION INTRAVENOUS CONTINUOUS
Status: DISCONTINUED | OUTPATIENT
Start: 2017-10-05 | End: 2017-10-05 | Stop reason: HOSPADM

## 2017-10-05 RX ORDER — LIDOCAINE HYDROCHLORIDE 10 MG/ML
INJECTION INFILTRATION; PERINEURAL
Status: DISCONTINUED | OUTPATIENT
Start: 2017-10-05 | End: 2017-10-05 | Stop reason: HOSPADM

## 2017-10-05 RX ORDER — DEXAMETHASONE SODIUM PHOSPHATE 100 MG/10ML
INJECTION INTRAMUSCULAR; INTRAVENOUS
Status: DISCONTINUED | OUTPATIENT
Start: 2017-10-05 | End: 2017-10-05 | Stop reason: HOSPADM

## 2017-10-05 RX ORDER — CHLORZOXAZONE 500 MG/1
500 TABLET ORAL 4 TIMES DAILY PRN
COMMUNITY
End: 2017-10-23 | Stop reason: SDUPTHER

## 2017-10-05 RX ADMIN — LIDOCAINE HYDROCHLORIDE 5 ML: 10 INJECTION, SOLUTION EPIDURAL; INFILTRATION; INTRACAUDAL; PERINEURAL at 03:10

## 2017-10-05 RX ADMIN — MIDAZOLAM HYDROCHLORIDE 3 MG: 1 INJECTION, SOLUTION INTRAMUSCULAR; INTRAVENOUS at 03:10

## 2017-10-05 RX ADMIN — MIDAZOLAM HYDROCHLORIDE 2 MG: 1 INJECTION, SOLUTION INTRAMUSCULAR; INTRAVENOUS at 03:10

## 2017-10-05 RX ADMIN — DEXAMETHASONE SODIUM PHOSPHATE 10 MG: 10 INJECTION INTRAMUSCULAR; INTRAVENOUS at 03:10

## 2017-10-05 RX ADMIN — FENTANYL CITRATE 25 MCG: 50 INJECTION, SOLUTION INTRAMUSCULAR; INTRAVENOUS at 03:10

## 2017-10-05 RX ADMIN — SODIUM CHLORIDE 500 ML: 900 INJECTION, SOLUTION INTRAVENOUS at 02:10

## 2017-10-05 RX ADMIN — LIDOCAINE HYDROCHLORIDE 10 ML: 10 INJECTION, SOLUTION INFILTRATION; PERINEURAL at 03:10

## 2017-10-05 RX ADMIN — IOHEXOL 3 ML: 300 INJECTION, SOLUTION INTRAVENOUS at 03:10

## 2017-10-05 NOTE — DISCHARGE INSTRUCTIONS

## 2017-10-05 NOTE — BRIEF OP NOTE
Discharge Diagnosis:Lumbar degnerative joint disease with lumbar radiculopathy [M54.16]  Condition on Discharge: Stable.  Diet on Discharge: Same as before.  Activity: as per instruction sheet.  Discharge to: Home with a responsible adult.  Follow up: as per Discharge instructions

## 2017-10-05 NOTE — OP NOTE
Right L5 and S1 Transforaminal epidural steroid injection with fluoro guidance.     Date of Service: 10/05/2017    PCP: Rene Monreal MD    Referring Physician: Area, MD    Time-out taken to identify patient and procedure side prior to starting the procedure.   I attest that I have reviewed the patient's home medications prior to the procedure and no contraindication have been identified. I  re-evaluated the patient after the patient was positioned for the procedure in the procedure room immediately before the procedural time-out. The vital signs are current and represent the current state of the patient which has not significantly changed since the preprocedure assessment.                                                           PROCEDURE: Right L5 and S1 transforaminal epidural steroid injection under fluoroscopy    REASON FOR PROCEDURE: lumbosacral degenerative joint disease with right lumbar radiculopathy [M54.16]    PHYSICIAN: Bob Addison MD  ASSISTANTS: Tmiothy Guzman MD, Pain Fellow      MEDICATIONS INJECTED:  Preservative-free dexamethasone 10mg, Xylocaine 1% MPF 3-5ml. 3ml per level. Preservative free, sterile normal saline is used to get larger volume as needed.  LOCAL ANESTHETIC INJECTED:  Xylocaine 1% 9ml with Sodium Bicarbonate 1ml. 3ml per site.    SEDATION MEDICATIONS: Versed 5 mg, Fentanyl 50 mcg    ESTIMATED BLOOD LOSS:  None.    COMPLICATIONS:  None.    TECHNIQUE:   Laying in a prone position, the patient was prepped and draped in the usual sterile fashion using ChloraPrep and fenestrated drape.  The area to be injected was determined under fluoroscopic guidance.  Local anesthetic was given by raising a wheel and going down to the hub of a 27-gauge 1.25 inch needle.  The 3.5inch 22-gauge spinal needle was introduced towards the transverse process of each above named nerve root level.  The needle was walked medially then hinged into the neural foramen.  Omnipaque was injected to confirm  appropriate placement and that there was no vascular runoff.  The medication was then injected after applying negative pressure. The patient tolerated the procedure well.    PAIN BEFORE THE PROCEDURE: 4/10.    PAIN AFTER THE PROCEDURE: 0/10.    The patient was monitored after the procedure.  Patient was given post procedure and discharge instructions to follow at home.  We will see the patient back in two weeks or the patient may call to inform of status. The patient was discharged in a stable condition.

## 2017-10-09 ENCOUNTER — HOSPITAL ENCOUNTER (OUTPATIENT)
Dept: RADIOLOGY | Facility: HOSPITAL | Age: 22
Discharge: HOME | End: 2017-10-09
Attending: FAMILY MEDICINE
Payer: COMMERCIAL

## 2017-10-09 ENCOUNTER — OFFICE VISIT (OUTPATIENT)
Dept: FAMILY MEDICINE | Facility: CLINIC | Age: 22
End: 2017-10-09
Payer: COMMERCIAL

## 2017-10-09 VITALS
DIASTOLIC BLOOD PRESSURE: 80 MMHG | HEIGHT: 68 IN | TEMPERATURE: 99 F | SYSTOLIC BLOOD PRESSURE: 120 MMHG | WEIGHT: 124.31 LBS | BODY MASS INDEX: 18.84 KG/M2 | HEART RATE: 84 BPM

## 2017-10-09 DIAGNOSIS — M54.30 SCIATICA, UNSPECIFIED LATERALITY: ICD-10-CM

## 2017-10-09 DIAGNOSIS — R05.9 COUGH: Primary | ICD-10-CM

## 2017-10-09 DIAGNOSIS — R05.9 COUGH: ICD-10-CM

## 2017-10-09 PROCEDURE — 71020 XR CHEST PA AND LATERAL: CPT | Mod: TC,PO

## 2017-10-09 PROCEDURE — 71020 XR CHEST PA AND LATERAL: CPT | Mod: 26,,, | Performed by: RADIOLOGY

## 2017-10-09 PROCEDURE — 99999 PR PBB SHADOW E&M-EST. PATIENT-LVL III: CPT | Mod: PBBFAC,,, | Performed by: FAMILY MEDICINE

## 2017-10-09 PROCEDURE — 99214 OFFICE O/P EST MOD 30 MIN: CPT | Mod: S$GLB,,, | Performed by: FAMILY MEDICINE

## 2017-10-09 PROCEDURE — 94640 AIRWAY INHALATION TREATMENT: CPT | Mod: S$GLB,,, | Performed by: FAMILY MEDICINE

## 2017-10-09 RX ORDER — ALBUTEROL SULFATE 0.83 MG/ML
2.5 SOLUTION RESPIRATORY (INHALATION)
Status: COMPLETED | OUTPATIENT
Start: 2017-10-09 | End: 2017-10-09

## 2017-10-09 RX ORDER — MELOXICAM 15 MG/1
15 TABLET ORAL DAILY
Qty: 30 TABLET | Refills: 3 | Status: SHIPPED | OUTPATIENT
Start: 2017-10-09 | End: 2017-12-08 | Stop reason: ALTCHOICE

## 2017-10-09 RX ORDER — ALBUTEROL SULFATE 90 UG/1
2 AEROSOL, METERED RESPIRATORY (INHALATION) EVERY 6 HOURS PRN
Qty: 1 EACH | Refills: 11 | Status: SHIPPED | OUTPATIENT
Start: 2017-10-09 | End: 2018-01-16 | Stop reason: ALTCHOICE

## 2017-10-09 RX ADMIN — ALBUTEROL SULFATE 2.5 MG: 0.83 SOLUTION RESPIRATORY (INHALATION) at 11:10

## 2017-10-09 NOTE — PROGRESS NOTES
Subjective:       Patient ID: Renzo Worley is a 22 y.o. male.    Chief Complaint: Fever; Chills; Anxiety; Insomnia; Leg Pain (right); and Cough    Disclaimer: This note has been generated using voice-recognition software. There may be typographical errors that have been missed during proof-reading    23 yo presents today for follow up of low back pain and cough, present for several days.  No fever or chills,denies URI symptoms.  Lower back pain has improved significantly since last seen, post MALINDA.  Has scheduled follow up appt with Pain Management in about 10 days.    Pt with prior history of anxiety symptoms, does not wish to start SSRI as previously recommended, hoping symptoms will improve when low back pain improves      Fever    Associated symptoms include coughing. Pertinent negatives include no chest pain, congestion, ear pain or wheezing.   Anxiety   Symptoms include nervous/anxious behavior. Patient reports no chest pain, dizziness, shortness of breath or suicidal ideas.       Leg Pain    Pertinent negatives include no numbness.   Cough   Associated symptoms include a fever. Pertinent negatives include no chest pain, chills, ear pain, shortness of breath or wheezing.     Review of Systems   Constitutional: Positive for fever. Negative for chills.   HENT: Negative for congestion, ear pain, sinus pain and sinus pressure.    Respiratory: Positive for cough. Negative for chest tightness, shortness of breath and wheezing.    Cardiovascular: Negative for chest pain.   Musculoskeletal: Positive for back pain.   Neurological: Negative for dizziness, weakness and numbness.   Psychiatric/Behavioral: Negative for agitation, self-injury, sleep disturbance and suicidal ideas. The patient is nervous/anxious.        Objective:      Physical Exam   Constitutional: He appears well-developed and well-nourished. No distress.   HENT:   Right Ear: Tympanic membrane and ear canal normal.   Left Ear: Tympanic membrane and ear  canal normal.   Nose: No mucosal edema or rhinorrhea. Right sinus exhibits no maxillary sinus tenderness and no frontal sinus tenderness. Left sinus exhibits no maxillary sinus tenderness and no frontal sinus tenderness.   Cardiovascular: Normal rate and regular rhythm.    No murmur heard.  Pulmonary/Chest: He has wheezes. He has no rales.   Few scattered ronchi       Assessment:       1. Cough    2. Sciatica, unspecified laterality        Plan:       1.  CXR is normal, he will use Albuterol MDA q4-6 hrs with f/u 2-3 days if not improved  2.  Stop smoking  3.  Continue present medications for low back pain

## 2017-10-09 NOTE — MEDICAL/APP STUDENT
Renzo Worley is a 22 y.o. Here for follow up for a mgmt of sciatica from a l5-s1 right paracentral disc herniation, as well as a new complaint of cough.    Radiculopathy: Renzo had an MALINDA with the pain mgmt clinic on Thursday. He reports a 2-point improvement out of 10 for radicular pain, increased ROM, and increased functionality. He is also taking chlorzoxazone, mobic, and ibuprofen for pain. He complains of difficulty sleeping due to pain and muscle tightness. Denies back pain, fecal incontinence, saddle anesthesia, or urinary retention.     Cough: pt has had a cough for the past few days. 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 Pt is a current smoker. Cough is productive with black and white mucus. Has taken mucinex for chest congestion. Endorses an episode of chills but no objective fever, sinus pain, rhinorrhea, neck or throat pain, allergies, or eye/ear irritation.    Mood Disorder--Not otherwise specified: Pt continues to experience decreased sleep, lack of appetite (25 lbs lost over 3 months), irritability, mood lability. He denies psychomotor retardation, SI's/HI's, feelings of guilt, hopelessness. He is smoking more to cope. Did not start the prescribed Sertraline for personal preference.    Review of Systems   Constitutional: Positive for chills, diaphoresis and weight loss. Negative for malaise/fatigue.   HENT: Negative for congestion, sinus pain and sore throat.    Eyes: Negative for discharge and redness.   Respiratory: Positive for cough, sputum production and wheezing. Negative for hemoptysis, shortness of breath and stridor.    Cardiovascular: Negative for chest pain, palpitations and orthopnea.   Gastrointestinal: Negative for abdominal pain, diarrhea, nausea and vomiting.   Genitourinary: Negative for dysuria, frequency and urgency.   Musculoskeletal: Positive for back pain and myalgias. Negative for joint pain and  "neck pain.   Skin: Negative for itching and rash.   Neurological: Negative for sensory change, focal weakness and weakness.   Psychiatric/Behavioral: Positive for substance abuse. Negative for depression, hallucinations, memory loss and suicidal ideas. The patient is nervous/anxious and has insomnia.        Objective:   /80 (BP Location: Left arm)   Pulse 84   Temp 99 °F (37.2 °C)   Ht 5' 7.5" (1.715 m)   Wt 56.4 kg (124 lb 5.4 oz)   BMI 19.19 kg/m²     Physical Exam   Cardiovascular: Normal rate, regular rhythm, normal heart sounds and intact distal pulses.    Pulmonary/Chest:   Wheezing to auscultation of lungs, bilaterally. No rales. Decreased air flow. Good expansion. Peak flow 300 ml/s.    Abdominal: Soft. Bowel sounds are normal. He exhibits no distension.   Musculoskeletal:   Right buttock with spasm. No central back pain. Injection site non-erythematous.   Neurological: He displays normal reflexes. No sensory deficit. He exhibits normal muscle tone.   Positive straight leg raise right leg.   Psychiatric:   Became slightly distressed/teary when discussing anxiety issues.     CXR: normal. Without signs concerning for PNA.    A/P:    Radiculopathy--2/10 improvement with MALINDA on Thursday. More improvement may be felt over the next week. Continue with muscle relaxants and NSAIDS, activity restriction, and PT.    Anxiety: Discussed SSRI--pt declined this and CBT for now. Provided web-based CBT resource.    Cough: improved with nebulized breathing rx in office. Counseled on smoking cessation. Not likely to be PNA with normal CXR. ABx not appropriate at this time. Rx for Albuterol sent for PRN use. Advised on proper administration technique.  "

## 2017-10-09 NOTE — PROGRESS NOTES
Two patient identifiers used.  Allergies reviewed. Pre-peak flow 270. Albuterol administered as ordered. Nebulizer treatment tolerated well. Post peak flow 300. Dr. Monreal notified of results.

## 2017-10-18 ENCOUNTER — OFFICE VISIT (OUTPATIENT)
Dept: PAIN MEDICINE | Facility: CLINIC | Age: 22
End: 2017-10-18
Payer: COMMERCIAL

## 2017-10-18 VITALS
HEART RATE: 82 BPM | BODY MASS INDEX: 20.08 KG/M2 | DIASTOLIC BLOOD PRESSURE: 74 MMHG | HEIGHT: 68 IN | SYSTOLIC BLOOD PRESSURE: 130 MMHG | WEIGHT: 132.5 LBS

## 2017-10-18 DIAGNOSIS — M51.26 HERNIATED LUMBAR INTERVERTEBRAL DISC: ICD-10-CM

## 2017-10-18 DIAGNOSIS — M54.16 LUMBAR RADICULOPATHY: Primary | ICD-10-CM

## 2017-10-18 PROCEDURE — 99213 OFFICE O/P EST LOW 20 MIN: CPT | Mod: S$GLB,,, | Performed by: NURSE PRACTITIONER

## 2017-10-18 PROCEDURE — 99999 PR PBB SHADOW E&M-EST. PATIENT-LVL III: CPT | Mod: PBBFAC,,, | Performed by: NURSE PRACTITIONER

## 2017-10-18 NOTE — PROGRESS NOTES
Chronic patient Established Note (Follow up visit)      SUBJECTIVE:    Renzo Worley presents to the clinic for a follow-up appointment for lower back and right leg pain.  The pain begins to the lower pain and radiates into the right buttock and down the posterior leg to the underside of his foot.   He is s/p right L5 and S1 TF MALINDA on 10/5/17 with 40% relief.  Since the procedure, his pain is not as severe.  He is able to walk and attend physical therapy.  He is interested in further procedures.  Since the last visit, Renzo Worley states the pain has been improving. Current pain intensity is 5/10.    Pain Disability Index Review:  Last 3 PDI Scores 10/18/2017 9/26/2017   Pain Disability Index (PDI) 54 54       Pain Medications:  Mobic 15 mg QD and Parafon Forte 500 mg PRN pain    Opioid Contract: no     report:  Not applicable    Pain Procedures:   10/5/17 Right L5 and S1 TF MALINDA- 40% relief    Physical Therapy/Home Exercise: yes    Imaging:     Lumbar MRI 9/23/17    Narrative     Technique:  Multiplanar, multisequence MR images were performed of the lumbar spine obtained without contrast.     Comparison: None.     Results:    Lumbar spine alignment is within normal limits. The vertebral body heights are well maintained, with no fracture.  No marrow signal abnormality suspicious for an infiltrative process.      The conus medullaris terminates at approximately the L1-L2 disk space.  The adjacent soft tissue structures show no significant abnormalities.  There is disc desiccation and mild to moderate disc space narrowing noted at the L5-S1 level.    L1-L2: No significant central canal or neural foraminal narrowing.    L2-L3: No significant central canal or neural foraminal narrowing.    L3-L4: No significant central canal or neural foraminal narrowing.    L4-L5:  No significant central canal or neural foraminal narrowing.    L5-S1:  Broad-based prominent central to right paracentral disc protrusion resulting in  abutment and effacement of the right S1 nerve root.  No significant central canal narrowing.  The bilateral neural foraminal canals are mildly narrowed.   Impression          1.  Broad-based disc protrusion at the L5-S1 level resulting in effacement of the proximal right S1 nerve root.          Allergies:   Review of patient's allergies indicates:   Allergen Reactions    No known drug allergies        Current Medications:   Current Outpatient Prescriptions   Medication Sig Dispense Refill    albuterol 90 mcg/actuation inhaler Inhale 2 puffs into the lungs every 6 (six) hours as needed for Wheezing. 1 each 11    chlorzoxazone (PARAFON FORTE) 500 mg Tab Take 500 mg by mouth 4 (four) times daily as needed.      meloxicam (MOBIC) 15 MG tablet Take 1 tablet (15 mg total) by mouth once daily. 30 tablet 3    sertraline (ZOLOFT) 50 MG tablet Take 1 tablet (50 mg total) by mouth once daily. 30 tablet 11     No current facility-administered medications for this visit.        REVIEW OF SYSTEMS:    GENERAL:  No weight loss, malaise or fevers.  HEENT:  Negative for frequent or significant headaches.  NECK:  Negative for lumps, goiter, pain and significant neck swelling.  RESPIRATORY:  Negative for cough, wheezing or shortness of breath.  CARDIOVASCULAR:  Negative for chest pain, leg swelling or palpitations.  GI:  Negative for abdominal discomfort, blood in stools or black stools or change in bowel habits.  MUSCULOSKELETAL:  See HPI.  SKIN:  Negative for lesions, rash, and itching.  PSYCH:  Negative for sleep disturbance, mood disorder and recent psychosocial stressors.  HEMATOLOGY/LYMPHOLOGY:  Negative for prolonged bleeding, bruising easily or swollen nodes.  NEURO:   No history of headaches, syncope, paralysis, seizures or tremors.  All other reviewed and negative other than HPI.    Past Medical History:  Past Medical History:   Diagnosis Date    Concussion without loss of consciousness 2009    Right sided sciatica  "8/2/2017       Past Surgical History:  History reviewed. No pertinent surgical history.    Family History:  Family History   Problem Relation Age of Onset    Hypertension Mother     Migraines Mother     Arthritis Mother     Migraines Sister     Migraines Brother     Multiple sclerosis Maternal Uncle     Cerebral palsy Paternal Aunt     Stroke Maternal Grandfather     Cancer Maternal Grandfather     Cancer Paternal Grandmother     Coronary artery disease Paternal Grandfather     Ulcers Paternal Grandfather        Social History:  Social History     Social History    Marital status: Single     Spouse name: N/A    Number of children: N/A    Years of education: N/A     Social History Main Topics    Smoking status: Current Every Day Smoker     Packs/day: 1.00     Types: Cigarettes    Smokeless tobacco: Never Used    Alcohol use 0.6 oz/week     1 Shots of liquor per week      Comment: occasional    Drug use: No    Sexual activity: Yes     Other Topics Concern    None     Social History Narrative    None       OBJECTIVE:    /74 (BP Location: Left arm, Patient Position: Sitting, BP Method: Large (Automatic))   Pulse 82   Ht 5' 7.5" (1.715 m)   Wt 60.1 kg (132 lb 7.9 oz)   BMI 20.45 kg/m²     PHYSICAL EXAMINATION:    General appearance: Well appearing, in no acute distress, alert and oriented x3.  Psych:  Mood and affect appropriate.  Skin: Skin color, texture, turgor normal, no rashes or lesions, in both upper and lower body.  Head/face:  Atraumatic, normocephalic. No palpable lymph nodes  Cor: RRR  Pulm: CTA  GI: Abdomen soft and non-tender.  Back: Straight leg raising in the sitting and supine positions is positive to radicular pain at right L5 and S1 distribution at 30 degrees. No pain to palpation over the spine or costovertebral angles. Limited flexion with pain.  Mild facet loading on the right side.  Extremities: Peripheral joint ROM is full and pain free without obvious instability or " laxity in all four extremities. No deformities, edema, or skin discoloration. Good capillary refill.  Musculoskeletal: Bilateral upper and lower extremity strength is normal and symmetric.  No atrophy or tone abnormalities are noted.  Neuro: Bilateral upper and lower extremity coordination and muscle stretch reflexes are physiologic and symmetric.  Plantar response are downgoing.   Gait: Antalgic.    ASSESSMENT: 22 y.o. year old male with lower back and right leg pain, consistent with the following diagnoses:     1. Lumbar radiculopathy     2. Herniated lumbar intervertebral disc           PLAN:     - Previous imaging was reviewed and discussed with the patient today.    - He is s/p right L5 and S1 TF MALINDA with benefit.  Will schedule for repeat.  The procedure, risks, benefits and options were discussed with patient. There are no contraindications to the procedure. The patient expressed understanding and agreed to proceed.  Consent obtained today.    - Consider adding Gabapentin in the future.    - RTC 2 weeks after procedure.    - Counseled patient regarding the importance of constant sleeping habits and physical therapy.    - Dr. Addison was consulted on the patient and agrees with this plan.      The above plan and management options were discussed at length with patient. Patient is in agreement with the above and verbalized understanding.    Isabell Rojo  10/18/2017

## 2017-10-19 ENCOUNTER — PATIENT MESSAGE (OUTPATIENT)
Dept: PAIN MEDICINE | Facility: CLINIC | Age: 22
End: 2017-10-19

## 2017-10-19 ENCOUNTER — PATIENT MESSAGE (OUTPATIENT)
Dept: FAMILY MEDICINE | Facility: CLINIC | Age: 22
End: 2017-10-19

## 2017-10-20 ENCOUNTER — TELEPHONE (OUTPATIENT)
Dept: PAIN MEDICINE | Facility: CLINIC | Age: 22
End: 2017-10-20

## 2017-10-20 NOTE — TELEPHONE ENCOUNTER
----- Message from Larry Bonilla sent at 10/20/2017  2:00 PM CDT -----  Contact: Pt mother   _  1st Request  _  2nd Request  _  3rd Request        Who: DARIAN PEREZ [[id number]]    Why: Returning a call back from your office regarding scheduling epidural. Please return the call at earliest convenience.   Thanks!    What Number to Call Back:[phone] (M)    When to Expect a call back: (With in 24 hours)

## 2017-10-23 ENCOUNTER — TELEPHONE (OUTPATIENT)
Dept: FAMILY MEDICINE | Facility: CLINIC | Age: 22
End: 2017-10-23

## 2017-10-23 RX ORDER — CHLORZOXAZONE 500 MG/1
500 TABLET ORAL 4 TIMES DAILY PRN
Qty: 30 TABLET | Refills: 6 | Status: SHIPPED | OUTPATIENT
Start: 2017-10-23 | End: 2017-11-13

## 2017-10-23 NOTE — TELEPHONE ENCOUNTER
Patient's mother made aware that Dr. Monreal is out of the office this week. Offered appointment with another provider. Patient's mother declines and voices that they only want to be seen by Dr. Monreal.

## 2017-10-23 NOTE — TELEPHONE ENCOUNTER
LOV 10/09/17. Patient requesting a muscle relaxer. Parafon- forte on patient med list from historical provider. Please advise.

## 2017-10-23 NOTE — TELEPHONE ENCOUNTER
----- Message from Carline Roberts sent at 10/23/2017  1:21 PM CDT -----  Contact: mother/ [phone]  Sooner appointment than the  can schedule.  Did you offer to schedule the next available appointment and put the patient on the wait list?:    When is the first available appointment: 11/98/17  What is the nature of the appointment: back pain  What visit type: ep/uc  Patient preference of timeframe to be scheduled:    Comments:

## 2017-10-27 ENCOUNTER — PATIENT MESSAGE (OUTPATIENT)
Dept: FAMILY MEDICINE | Facility: CLINIC | Age: 22
End: 2017-10-27

## 2017-10-27 ENCOUNTER — PATIENT MESSAGE (OUTPATIENT)
Dept: PAIN MEDICINE | Facility: OTHER | Age: 22
End: 2017-10-27

## 2017-11-01 ENCOUNTER — TELEPHONE (OUTPATIENT)
Dept: PAIN MEDICINE | Facility: CLINIC | Age: 22
End: 2017-11-01

## 2017-11-01 NOTE — TELEPHONE ENCOUNTER
----- Message from Jeanne León sent at 11/1/2017  2:20 PM CDT -----  Per [employer] guidelines case denied and Medical Director would like to speak to the provider.  P2P #[phone]  Option 1 and then #2.  P2P has to be done by 5:00 pm today.    Thanks

## 2017-11-02 NOTE — TELEPHONE ENCOUNTER
Contacted and left message for patient to contact office to discuss his procedure needing to be changed as the original procedure RFA has been denied.

## 2017-11-08 ENCOUNTER — NURSE TRIAGE (OUTPATIENT)
Dept: ADMINISTRATIVE | Facility: CLINIC | Age: 22
End: 2017-11-08

## 2017-11-09 ENCOUNTER — TELEPHONE (OUTPATIENT)
Dept: PAIN MEDICINE | Facility: CLINIC | Age: 22
End: 2017-11-09

## 2017-11-09 NOTE — TELEPHONE ENCOUNTER
"Contacted and spoke to patient regarding message, he was informed that a message was left for him regarding his procedure being denied and the options that Dr. Addison was offering was a caudal and/or a spine surgery consult.     Mr. Worley stated " he would like to think about those options, he will contact the office once he has made a decision."    "

## 2017-11-09 NOTE — TELEPHONE ENCOUNTER
"  Reason for Disposition   Question about upcoming scheduled test, no triage required and triager able to answer question    Answer Assessment - Initial Assessment Questions  1. REASON FOR CALL or QUESTION: "What is your reason for calling today?" or "How can I best help you?" or "What question do you have that I can help answer?"      advice    Protocols used: ST INFORMATION ONLY CALL-A-  Patients mother is calling. She was told my the insurance company "papers" were not completely filled at [facility]. Mom wanted to have the papers completed tonight,etc.  I have advised mom I will send message on high priority and someone would call to he in early am.  "

## 2017-11-09 NOTE — TELEPHONE ENCOUNTER
----- Message from Kristina Bonner sent at 11/9/2017  9:10 AM CST -----  Contact: Lyndsay tse _1st Request  _  2nd Request  _  3rd Request    Who:Lyndsay Patient Mom     Why:Patient mother wants to see what would be there next step since the procedure was canceled she is requesting a call back     What Number to Call Back:[phone]    When to Expect a call back: (Before the end of the day)   -- if call after 3:00 call back will be tomorrow.

## 2017-11-10 ENCOUNTER — TELEPHONE (OUTPATIENT)
Dept: PAIN MEDICINE | Facility: CLINIC | Age: 22
End: 2017-11-10

## 2017-11-10 NOTE — TELEPHONE ENCOUNTER
----- Message from Susi Quigley sent at 11/10/2017  3:40 PM CST -----  Contact: Pt's mother  Pt's mother is returning call and would like a sooner appt than appt scheduled.  Mother would also like to discuss injection, was denied per insurance.    Pt's mother can be reached at [phone].  Thank you

## 2017-11-10 NOTE — TELEPHONE ENCOUNTER
Contacted and spoke to patient mother, it was explained to her that the options of a different procedure and/or a cons with spine surgery was offered as the procedure was denied due to limited benefit.     Patient mother asked that his current appointment be scheduled sooner so they can further discuss and understand his options better.

## 2017-11-13 ENCOUNTER — OFFICE VISIT (OUTPATIENT)
Dept: PAIN MEDICINE | Facility: CLINIC | Age: 22
End: 2017-11-13
Payer: COMMERCIAL

## 2017-11-13 ENCOUNTER — TELEPHONE (OUTPATIENT)
Dept: PAIN MEDICINE | Facility: CLINIC | Age: 22
End: 2017-11-13

## 2017-11-13 VITALS
HEIGHT: 67 IN | BODY MASS INDEX: 20.69 KG/M2 | HEART RATE: 74 BPM | DIASTOLIC BLOOD PRESSURE: 68 MMHG | WEIGHT: 131.81 LBS | TEMPERATURE: 98 F | SYSTOLIC BLOOD PRESSURE: 104 MMHG

## 2017-11-13 DIAGNOSIS — M54.31 RIGHT SIDED SCIATICA: ICD-10-CM

## 2017-11-13 DIAGNOSIS — M54.16 LUMBAR RADICULOPATHY: ICD-10-CM

## 2017-11-13 DIAGNOSIS — M51.26 HERNIATED LUMBAR INTERVERTEBRAL DISC: ICD-10-CM

## 2017-11-13 DIAGNOSIS — M54.16 LUMBAR RADICULOPATHY, CHRONIC: Primary | ICD-10-CM

## 2017-11-13 PROCEDURE — 99999 PR PBB SHADOW E&M-EST. PATIENT-LVL III: CPT | Mod: PBBFAC,,, | Performed by: NURSE PRACTITIONER

## 2017-11-13 PROCEDURE — 99213 OFFICE O/P EST LOW 20 MIN: CPT | Mod: S$GLB,,, | Performed by: NURSE PRACTITIONER

## 2017-11-13 RX ORDER — TIZANIDINE 4 MG/1
4 TABLET ORAL 4 TIMES DAILY PRN
Qty: 120 TABLET | Refills: 1 | Status: SHIPPED | OUTPATIENT
Start: 2017-11-13 | End: 2017-12-05

## 2017-11-13 NOTE — TELEPHONE ENCOUNTER
----- Message from Pat León sent at 11/13/2017  9:06 AM CST -----  x_  1st Request  _  2nd Request  _  3rd Request        Who: pt.mother devyn    Why: pt. Wants to know does lesa have a sooner appt. Than 1:00p.m. Pt. Mother was advised 1:00p.m appt. Is the only available appt. For today. Osiris call to discuss    What Number to Call Back:[phone]    When to Expect a call back: (Before the end of the day)   -- if the call is after 12:00, the call back will be tomorrow.

## 2017-11-13 NOTE — TELEPHONE ENCOUNTER
Staff spoke with patient's mother whom states there is a flight she had to be at by this afternoon and wanted to know if they could be seen sooner by Isabell Rojo NP.    Staff offered to check our other nurse practitioner's schedule for availability. The next available for Frances Rodriguez NP was 1:20 pm later than the scheduled appointment that he has for today 11/13/17.    Patient's mother states that patient  will come to the 1 pm appointment today.

## 2017-11-13 NOTE — PROGRESS NOTES
Chronic patient Established Note (Follow up visit)      SUBJECTIVE:    Renzo Worley presents to the clinic for a follow-up appointment for lower back and right leg pain.  He is here today to discuss his options.  He previously had right L5 and S1 TF MALINDA on 10/5/17 with 40% relief initially.  I scheduled him for a repeat.  However, this was denied by his insurance.  Dr. Addison recommended a caudal MALINDA or surgical consult.   He would like to discuss these options.  His pain is returning to baseline.  He continues with right sided back pain which radiates down the back of his right leg to the underside of his right foot.  He continues to participate in physical therapy exercises with minimal benefit.  He is taking Parafon Forte for muscle spasms which is no longer helping.  Since the last visit, Renzo Worley states the pain has been worsening.  Current pain intensity is 7/10.  The patient denies any bowel or bladder incontinence or signs of saddle paresthesia.  The patient denies any major medical changes since last office visit.      Pain Disability Index Review:  Last 3 PDI Scores 11/13/2017 10/18/2017 9/26/2017   Pain Disability Index (PDI) 53 54 54       Pain Medications:  Mobic 15 mg QD and Parafon Forte 500 mg PRN pain    Opioid Contract: no     report:  Not applicable    Pain Procedures:   10/5/17 Right L5 and S1 TF MALINDA- 40% relief    Physical Therapy/Home Exercise: yes    Imaging:     Lumbar MRI 9/23/17    Narrative     Technique:  Multiplanar, multisequence MR images were performed of the lumbar spine obtained without contrast.     Comparison: None.     Results:    Lumbar spine alignment is within normal limits. The vertebral body heights are well maintained, with no fracture.  No marrow signal abnormality suspicious for an infiltrative process.      The conus medullaris terminates at approximately the L1-L2 disk space.  The adjacent soft tissue structures show no significant abnormalities.  There is  disc desiccation and mild to moderate disc space narrowing noted at the L5-S1 level.    L1-L2: No significant central canal or neural foraminal narrowing.    L2-L3: No significant central canal or neural foraminal narrowing.    L3-L4: No significant central canal or neural foraminal narrowing.    L4-L5:  No significant central canal or neural foraminal narrowing.    L5-S1:  Broad-based prominent central to right paracentral disc protrusion resulting in abutment and effacement of the right S1 nerve root.  No significant central canal narrowing.  The bilateral neural foraminal canals are mildly narrowed.   Impression          1.  Broad-based disc protrusion at the L5-S1 level resulting in effacement of the proximal right S1 nerve root.          Allergies:   Review of patient's allergies indicates:   Allergen Reactions    No known drug allergies        Current Medications:   Current Outpatient Prescriptions   Medication Sig Dispense Refill    albuterol 90 mcg/actuation inhaler Inhale 2 puffs into the lungs every 6 (six) hours as needed for Wheezing. 1 each 11    chlorzoxazone (PARAFON FORTE) 500 mg Tab Take 1 tablet (500 mg total) by mouth 4 (four) times daily as needed. 30 tablet 6    meloxicam (MOBIC) 15 MG tablet Take 1 tablet (15 mg total) by mouth once daily. 30 tablet 3    sertraline (ZOLOFT) 50 MG tablet Take 1 tablet (50 mg total) by mouth once daily. 30 tablet 11     No current facility-administered medications for this visit.        REVIEW OF SYSTEMS:    GENERAL:  No weight loss, malaise or fevers.  HEENT:  Negative for frequent or significant headaches.  NECK:  Negative for lumps, goiter, pain and significant neck swelling.  RESPIRATORY:  Negative for cough, wheezing or shortness of breath.  CARDIOVASCULAR:  Negative for chest pain, leg swelling or palpitations.  GI:  Negative for abdominal discomfort, blood in stools or black stools or change in bowel habits.  MUSCULOSKELETAL:  See HPI.  SKIN:   "Negative for lesions, rash, and itching.  PSYCH:  Negative for sleep disturbance, mood disorder and recent psychosocial stressors.  HEMATOLOGY/LYMPHOLOGY:  Negative for prolonged bleeding, bruising easily or swollen nodes.  NEURO:   No history of headaches, syncope, paralysis, seizures or tremors.  All other reviewed and negative other than HPI.    Past Medical History:  Past Medical History:   Diagnosis Date    Concussion without loss of consciousness 2009    Right sided sciatica 8/2/2017       Past Surgical History:  History reviewed. No pertinent surgical history.    Family History:  Family History   Problem Relation Age of Onset    Hypertension Mother     Migraines Mother     Arthritis Mother     Migraines Sister     Migraines Brother     Multiple sclerosis Maternal Uncle     Cerebral palsy Paternal Aunt     Stroke Maternal Grandfather     Cancer Maternal Grandfather     Cancer Paternal Grandmother     Coronary artery disease Paternal Grandfather     Ulcers Paternal Grandfather        Social History:  Social History     Social History    Marital status: Single     Spouse name: N/A    Number of children: N/A    Years of education: N/A     Social History Main Topics    Smoking status: Current Every Day Smoker     Packs/day: 1.00     Types: Cigarettes    Smokeless tobacco: Never Used    Alcohol use 0.6 oz/week     1 Shots of liquor per week      Comment: occasional    Drug use: No    Sexual activity: Yes     Other Topics Concern    None     Social History Narrative    None       OBJECTIVE:    /68   Pulse 74   Temp 98.3 °F (36.8 °C)   Ht 5' 7" (1.702 m)   Wt 59.8 kg (131 lb 13.4 oz)   BMI 20.65 kg/m²     PHYSICAL EXAMINATION:    General appearance: Well appearing, in no acute distress, alert and oriented x3.  Psych:  Mood and affect appropriate.  Skin: Skin color, texture, turgor normal, no rashes or lesions, in both upper and lower body.  Head/face:  Atraumatic, normocephalic. No " palpable lymph nodes  Cor: RRR  Pulm: CTA  GI: Abdomen soft and non-tender.  Back: Straight leg raising in the sitting and supine positions is positive to radicular pain at right L5 and S1 distribution at 30 degrees. No pain to palpation over the spine or costovertebral angles. Limited flexion with pain.  Negative facet loading bilaterally.  Extremities: Peripheral joint ROM is full and pain free without obvious instability or laxity in all four extremities. No deformities, edema, or skin discoloration. Good capillary refill.  Musculoskeletal: There is pain with palpation to right piriformis muscle.  Piriformis stretch test is negative.  5/5 strength in right ankle with plantar and dorsiflexion. 5/5 strength in left ankle with plantar and dorsiflexion. 5/5 strength with right knee flexion and extension. 5/5 strength with left knee flexion and extension. 4/5 strength in right EHL, 5/5 strength in left EHL.  No atrophy or tone abnormalities are noted.  Neuro: Bilateral upper and lower extremity coordination and muscle stretch reflexes are physiologic and symmetric.  Plantar response are downgoing.   Gait: Antalgic.    ASSESSMENT: 22 y.o. year old male with lower back and right leg pain, consistent with the following diagnoses:     1. Lumbar radiculopathy, chronic     2. Lumbar radiculopathy     3. Right sided sciatica     4. Herniated lumbar intervertebral disc           PLAN:     - Previous imaging was reviewed and discussed with the patient today.    - He declined surgical consult at this time.    - He had 40% relief from right L5 and S1 TF MALINDA.  Will schedule for caudal MALINDA to hopefully give the patient more relief.  The procedure, risks, benefits and options were discussed with patient. There are no contraindications to the procedure. The patient expressed understanding and agreed to proceed.  Consent obtained today.    - D/C Parafon Forte and start Zanaflex 4 mg Q6h PRN muscle spasms.    - Consider adding  Gabapentin in the future.    - RTC 2 weeks after procedure.    - Counseled patient regarding the importance of constant sleeping habits and physical therapy.    - Dr. Addison was consulted on the patient and agrees with this plan.      The above plan and management options were discussed at length with patient. Patient is in agreement with the above and verbalized understanding.    Isabell Rojo  11/13/2017

## 2017-11-15 NOTE — PROGRESS NOTES
Name: Renzo MAITE Worley  Referring Provider: Quentin Warren, *  PT Order: PT evaluate and treat  Clinical #: [id number]  Discharge Summary Date: 11/15/2017  Diagnosis:   Encounter Diagnoses   Name Primary?    Right hip pain Yes    Right sided sciatica        Patient was seen for 1 OP PT visit on 8/2/2017 . Pt missed/no visit all other scheduled sessions. Treatment included: evaluation, HEP, pt education, aquatic therapy, joint mobilizations, ther ex, and stretching. PT unable to fully assess goal achievement as pt did not return for follow up sessions/did not reschedule follow up visits. This patient is discharged from OP PT Services.    Micaela Avalos DPT  11/15/2017

## 2017-11-20 ENCOUNTER — SURGERY (OUTPATIENT)
Age: 22
End: 2017-11-20

## 2017-11-20 ENCOUNTER — HOSPITAL ENCOUNTER (OUTPATIENT)
Facility: OTHER | Age: 22
Discharge: HOME | End: 2017-11-20
Attending: ANESTHESIOLOGY | Admitting: ANESTHESIOLOGY
Payer: COMMERCIAL

## 2017-11-20 VITALS
WEIGHT: 135 LBS | OXYGEN SATURATION: 99 % | SYSTOLIC BLOOD PRESSURE: 125 MMHG | RESPIRATION RATE: 18 BRPM | BODY MASS INDEX: 20.46 KG/M2 | HEART RATE: 81 BPM | TEMPERATURE: 98 F | HEIGHT: 68 IN | DIASTOLIC BLOOD PRESSURE: 58 MMHG

## 2017-11-20 DIAGNOSIS — M54.31 RIGHT SIDED SCIATICA: Primary | ICD-10-CM

## 2017-11-20 DIAGNOSIS — M54.16 LUMBAR RADICULOPATHY, CHRONIC: ICD-10-CM

## 2017-11-20 PROCEDURE — 62322 NJX INTERLAMINAR LMBR/SAC: CPT | Performed by: ANESTHESIOLOGY

## 2017-11-20 PROCEDURE — 25500020 PHARM REV CODE 255: Performed by: ANESTHESIOLOGY

## 2017-11-20 PROCEDURE — 25000003 PHARM REV CODE 250: Performed by: ANESTHESIOLOGY

## 2017-11-20 PROCEDURE — 63600175 PHARM REV CODE 636 W HCPCS: Performed by: ANESTHESIOLOGY

## 2017-11-20 PROCEDURE — 62327 NJX INTERLAMINAR LMBR/SAC: CPT | Mod: ,,, | Performed by: ANESTHESIOLOGY

## 2017-11-20 PROCEDURE — 25000003 PHARM REV CODE 250: Performed by: STUDENT IN AN ORGANIZED HEALTH CARE EDUCATION/TRAINING PROGRAM

## 2017-11-20 PROCEDURE — 62323 NJX INTERLAMINAR LMBR/SAC: CPT | Performed by: ANESTHESIOLOGY

## 2017-11-20 PROCEDURE — 99152 MOD SED SAME PHYS/QHP 5/>YRS: CPT | Mod: ,,, | Performed by: ANESTHESIOLOGY

## 2017-11-20 RX ORDER — LIDOCAINE HYDROCHLORIDE 10 MG/ML
INJECTION INFILTRATION; PERINEURAL
Status: DISCONTINUED | OUTPATIENT
Start: 2017-11-20 | End: 2017-11-20 | Stop reason: HOSPADM

## 2017-11-20 RX ORDER — DEXAMETHASONE SODIUM PHOSPHATE 100 MG/10ML
INJECTION INTRAMUSCULAR; INTRAVENOUS
Status: DISCONTINUED | OUTPATIENT
Start: 2017-11-20 | End: 2017-11-20 | Stop reason: HOSPADM

## 2017-11-20 RX ORDER — BUPIVACAINE HYDROCHLORIDE 2.5 MG/ML
INJECTION, SOLUTION EPIDURAL; INFILTRATION; INTRACAUDAL
Status: DISCONTINUED | OUTPATIENT
Start: 2017-11-20 | End: 2017-11-20 | Stop reason: HOSPADM

## 2017-11-20 RX ORDER — MIDAZOLAM HYDROCHLORIDE 1 MG/ML
INJECTION INTRAMUSCULAR; INTRAVENOUS
Status: DISCONTINUED | OUTPATIENT
Start: 2017-11-20 | End: 2017-11-20 | Stop reason: HOSPADM

## 2017-11-20 RX ORDER — FENTANYL CITRATE 50 UG/ML
INJECTION, SOLUTION INTRAMUSCULAR; INTRAVENOUS
Status: DISCONTINUED | OUTPATIENT
Start: 2017-11-20 | End: 2017-11-20 | Stop reason: HOSPADM

## 2017-11-20 RX ORDER — SODIUM CHLORIDE 9 MG/ML
500 INJECTION, SOLUTION INTRAVENOUS CONTINUOUS
Status: DISCONTINUED | OUTPATIENT
Start: 2017-11-20 | End: 2017-11-20 | Stop reason: HOSPADM

## 2017-11-20 RX ADMIN — IOHEXOL 50 ML: 300 INJECTION, SOLUTION INTRAVENOUS at 03:11

## 2017-11-20 RX ADMIN — BUPIVACAINE HYDROCHLORIDE 10 ML: 2.5 INJECTION, SOLUTION EPIDURAL; INFILTRATION; INTRACAUDAL; PERINEURAL at 03:11

## 2017-11-20 RX ADMIN — MIDAZOLAM HYDROCHLORIDE 3 MG: 1 INJECTION, SOLUTION INTRAMUSCULAR; INTRAVENOUS at 03:11

## 2017-11-20 RX ADMIN — SODIUM CHLORIDE 500 ML: 0.9 INJECTION, SOLUTION INTRAVENOUS at 02:11

## 2017-11-20 RX ADMIN — DEXAMETHASONE SODIUM PHOSPHATE 10 MG: 10 INJECTION INTRAMUSCULAR; INTRAVENOUS at 03:11

## 2017-11-20 RX ADMIN — FENTANYL CITRATE 50 MCG: 50 INJECTION, SOLUTION INTRAMUSCULAR; INTRAVENOUS at 03:11

## 2017-11-20 RX ADMIN — LIDOCAINE HYDROCHLORIDE 10 ML: 10 INJECTION, SOLUTION INFILTRATION; PERINEURAL at 03:11

## 2017-11-20 NOTE — PLAN OF CARE
Pt tolerated procedure well. Reports 3/10 pain after procedure. Pt assisted up for first time, steady on feet. D/c instructions given.

## 2017-11-20 NOTE — OP NOTE
Caudal Epidural Steroid Injection under Fluoroscopy  Current medications reviewed. Time-out taken to identify patient and procedure prior to starting the procedure.      Date of Service: 11/20/2017    PCP: Rene Monreal MD    Referring Physician:    I attest that I have reviewed the patient's home medications prior to the procedure and no contraindication have been identified. I  re-evaluated the patient after the patient was positioned for the procedure in the procedure room immediately before the procedural time-out. The vital signs are current and represent the current state of the patient which has not significantly changed since the preprocedure assessment.                                                   PROCEDURE:  Caudal epidural steroid injection under fluoroscopy with insertion of flexible catheter    REASON FOR PROCEDURE:  Lumbar radiculopathy [M54.16]    PHYSICIAN: Bob Addison MD  ASSISTANTS: Sreekanth Ralph MD, Resident, PGY2    MEDICATIONS INJECTED:  Preservative-free dexamethasone 10mg, sterile preservative free normal saline 2-4ml and preservative free sterile Bupivicaine 0.25% 5ml.    LOCAL ANESTHETIC GIVEN:  Xylocaine 1% 9ml with Sodium Bicarbonate 1ml.   SEDATION MEDICATIONS: 3mg IV versed and 50mcg IV fentanyl    ESTIMATED BLOOD LOSS:  None.    COMPLICATIONS:  None.    TECHNIQUE:   Laying in the prone position, the patient was prepped and draped in the usual sterile fashion using ChloraPrep and fenestrated drape.  Appropriate anatomic landmarks were determined including the superior LS-spine and sacral hiatus.  Local anesthetic was given by raising a wheel and going down to the periosteum.  A 3.5in 16-gauge spinal needle was introduced thru the sacral hiatus.  Omnipaque was injected to confirm placement in the appropriate area and that there was no vascular runoff.  The flexible catheter threaded through to the desired levels. Omnipaque was reinjected to confirm placement in the appropriate  area. The medication was then injected slowly.  The patient tolerated the procedure well.    PAIN BEFORE THE PROCEDURE:  7/10.    PAIN AFTER THE PROCEDURE: 3/10.    The patient was monitored after the procedure.  Patient was given post procedure and discharge instructions to follow at home.  We will see the patient back in two weeks or the patient may call to inform of status. The patient was discharged in a stable condition.

## 2017-11-20 NOTE — INTERVAL H&P NOTE
The patient has been examined and the H&P has been reviewed:    I concur with the findings and no changes have occurred since H&P was written.     Physical Exam:   Gen: AAOx3  Heart: RRR  Lungs: NIWOB    Anesthesia/Surgery risks, benefits and alternative options discussed and understood by patient/family.  ASSESSMENT: LUMBAR DJD  Active Hospital Problems    Diagnosis  POA    Lumbar radiculopathy, chronic [M54.16]  Yes      Resolved Hospital Problems    Diagnosis Date Resolved POA   No resolved problems to display.     PLAN: Proceed with scheduled procedure of caudal MALINDA.

## 2017-11-20 NOTE — DISCHARGE INSTRUCTIONS

## 2017-11-28 ENCOUNTER — TELEPHONE (OUTPATIENT)
Dept: PAIN MEDICINE | Facility: CLINIC | Age: 22
End: 2017-11-28

## 2017-11-28 NOTE — TELEPHONE ENCOUNTER
----- Message from Pat León sent at 11/28/2017  9:13 AM CST -----  x_  1st Request  _  2nd Request  _  3rd Request        Who: jared    Why: pt. Mother wants her son to be seen today by  only. Pt. Was advised next available appt. Is 11/29. Please call to discuss      What Number to Call Back:[phone]    When to Expect a call back: (Before the end of the day)   -- if the call is after 12:00, the call back will be tomorrow.

## 2017-11-28 NOTE — TELEPHONE ENCOUNTER
Contacted and spoke to patient mother regarding her request. She was informed that Dr. Addison has no availability today and tomorrow patient is scheduled.     Patient mother wanted to change current appointment time as it wasn't convenient for patient father.     She was informed that Dr. Addison will be in surgery tomorrow afternoon and is only in clinic in the AM.     Mother opted to keep patient current appointment.

## 2017-11-30 ENCOUNTER — PATIENT MESSAGE (OUTPATIENT)
Dept: PAIN MEDICINE | Facility: CLINIC | Age: 22
End: 2017-11-30

## 2017-12-03 ENCOUNTER — OFFICE VISIT (OUTPATIENT)
Dept: URGENT CARE | Facility: CLINIC | Age: 22
End: 2017-12-03
Payer: COMMERCIAL

## 2017-12-03 VITALS
RESPIRATION RATE: 18 BRPM | OXYGEN SATURATION: 97 % | SYSTOLIC BLOOD PRESSURE: 122 MMHG | DIASTOLIC BLOOD PRESSURE: 68 MMHG | HEART RATE: 101 BPM | BODY MASS INDEX: 20.46 KG/M2 | HEIGHT: 68 IN | WEIGHT: 135 LBS | TEMPERATURE: 99 F

## 2017-12-03 DIAGNOSIS — M54.50 ACUTE RIGHT-SIDED LOW BACK PAIN WITHOUT SCIATICA: ICD-10-CM

## 2017-12-03 DIAGNOSIS — M79.18 RIGHT BUTTOCK PAIN: Primary | ICD-10-CM

## 2017-12-03 PROCEDURE — 99214 OFFICE O/P EST MOD 30 MIN: CPT | Mod: 25,S$GLB,, | Performed by: FAMILY MEDICINE

## 2017-12-03 PROCEDURE — 96372 THER/PROPH/DIAG INJ SC/IM: CPT | Mod: S$GLB,,, | Performed by: FAMILY MEDICINE

## 2017-12-03 RX ORDER — HYDROCODONE BITARTRATE AND ACETAMINOPHEN 5; 325 MG/1; MG/1
1 TABLET ORAL EVERY 8 HOURS PRN
Qty: 15 TABLET | Refills: 0 | Status: SHIPPED | OUTPATIENT
Start: 2017-12-03 | End: 2017-12-08

## 2017-12-03 RX ORDER — KETOROLAC TROMETHAMINE 30 MG/ML
60 INJECTION, SOLUTION INTRAMUSCULAR; INTRAVENOUS
Status: COMPLETED | OUTPATIENT
Start: 2017-12-03 | End: 2017-12-03

## 2017-12-03 RX ORDER — CYCLOBENZAPRINE HCL 10 MG
10 TABLET ORAL NIGHTLY
Qty: 30 TABLET | Refills: 0 | Status: SHIPPED | OUTPATIENT
Start: 2017-12-03 | End: 2017-12-05

## 2017-12-03 RX ADMIN — KETOROLAC TROMETHAMINE 60 MG: 30 INJECTION, SOLUTION INTRAMUSCULAR; INTRAVENOUS at 03:12

## 2017-12-03 NOTE — PROGRESS NOTES
"Subjective:       Patient ID: Renzo Worley is a 22 y.o. male.    Vitals:  height is 5' 8" (1.727 m) and weight is 61.2 kg (135 lb). His temperature is 99.4 °F (37.4 °C). His blood pressure is 122/68 and his pulse is 101. His respiration is 18 and oxygen saturation is 97%.     Chief Complaint: Back Pain    Back Pain   This is a new problem. The current episode started more than 1 year ago. The problem occurs constantly. The problem has been gradually worsening since onset. The pain is present in the gluteal and lumbar spine. The quality of the pain is described as burning and stabbing. The pain does not radiate. The pain is at a severity of 9/10. The pain is severe. The symptoms are aggravated by sitting and stress. Stiffness is present in the morning. Pertinent negatives include no abdominal pain, bladder incontinence, bowel incontinence, dysuria or numbness. He has tried heat, ice, muscle relaxant and NSAIDs for the symptoms. The treatment provided mild relief.     Review of Systems   Constitution: Negative for malaise/fatigue.   Skin: Negative for rash.   Musculoskeletal: Positive for back pain, joint pain, muscle cramps, muscle weakness and stiffness.   Gastrointestinal: Negative for abdominal pain and bowel incontinence.   Genitourinary: Negative for bladder incontinence, dysuria, hematuria and urgency.   Neurological: Negative for disturbances in coordination and numbness.       Objective:      Physical Exam   Constitutional: He is oriented to person, place, and time. He appears well-developed and well-nourished.   HENT:   Head: Normocephalic and atraumatic.   Eyes: EOM are normal. Pupils are equal, round, and reactive to light.   Neck: Normal range of motion. Neck supple. No JVD present. No tracheal deviation present. No thyromegaly present.   Cardiovascular: Normal rate, regular rhythm and normal heart sounds.  Exam reveals no gallop and no friction rub.    No murmur heard.  Pulmonary/Chest: Breath sounds " normal. No respiratory distress. He has no wheezes. He has no rales. He exhibits no tenderness.   Abdominal: Soft. Bowel sounds are normal. He exhibits no distension and no mass. There is no tenderness. There is no rebound and no guarding. No hernia.   Musculoskeletal: Normal range of motion.        Legs:  Moderate pain with pressure in the right buttock around the periformis muscle area.  No swelling, no redness, no ecchymosis, no warmth.  Good rom of the right hip.   Lymphadenopathy:     He has no cervical adenopathy.   Neurological: He is alert and oriented to person, place, and time. He displays normal reflexes. No cranial nerve deficit. He exhibits normal muscle tone. Coordination normal.   Skin: Skin is warm. Capillary refill takes less than 2 seconds. No rash noted. No erythema. No pallor.   Psychiatric: He has a normal mood and affect. His behavior is normal. Judgment and thought content normal.   Vitals reviewed.      Assessment:       1. Right buttock pain    2. Acute right-sided low back pain without sciatica        Plan:         Right buttock pain  -     ketorolac injection 60 mg; Inject 2 mLs (60 mg total) into the muscle one time.  -     hydrocodone-acetaminophen 5-325mg (NORCO) 5-325 mg per tablet; Take 1 tablet by mouth every 8 (eight) hours as needed for Pain.  Dispense: 15 tablet; Refill: 0    Acute right-sided low back pain without sciatica  -     ketorolac injection 60 mg; Inject 2 mLs (60 mg total) into the muscle one time.  -     cyclobenzaprine (FLEXERIL) 10 MG tablet; Take 1 tablet (10 mg total) by mouth every evening.  Dispense: 30 tablet; Refill: 0  -     hydrocodone-acetaminophen 5-325mg (NORCO) 5-325 mg per tablet; Take 1 tablet by mouth every 8 (eight) hours as needed for Pain.  Dispense: 15 tablet; Refill: 0      Follow up with your pain management doctor for pain management.  Return to the urgent care or go to the ER if symptoms get worse.    Asael Spencer MD

## 2017-12-03 NOTE — PATIENT INSTRUCTIONS
Follow up with your pain management doctor for pain management.  Return to the urgent care or go to the ER if symptoms get worse.    Asael Spencer MD

## 2017-12-04 ENCOUNTER — PATIENT MESSAGE (OUTPATIENT)
Dept: PAIN MEDICINE | Facility: CLINIC | Age: 22
End: 2017-12-04

## 2017-12-05 ENCOUNTER — OFFICE VISIT (OUTPATIENT)
Dept: PAIN MEDICINE | Facility: CLINIC | Age: 22
End: 2017-12-05
Attending: ANESTHESIOLOGY
Payer: COMMERCIAL

## 2017-12-05 VITALS
DIASTOLIC BLOOD PRESSURE: 68 MMHG | SYSTOLIC BLOOD PRESSURE: 110 MMHG | RESPIRATION RATE: 18 BRPM | HEIGHT: 68 IN | TEMPERATURE: 99 F | HEART RATE: 93 BPM

## 2017-12-05 DIAGNOSIS — M51.26 HERNIATED LUMBAR INTERVERTEBRAL DISC: ICD-10-CM

## 2017-12-05 DIAGNOSIS — M47.26 OSTEOARTHRITIS OF SPINE WITH RADICULOPATHY, LUMBAR REGION: ICD-10-CM

## 2017-12-05 DIAGNOSIS — M54.16 LUMBAR RADICULOPATHY: Primary | ICD-10-CM

## 2017-12-05 DIAGNOSIS — M79.10 MYALGIA: ICD-10-CM

## 2017-12-05 DIAGNOSIS — G89.4 CHRONIC PAIN DISORDER: ICD-10-CM

## 2017-12-05 PROCEDURE — 99999 PR PBB SHADOW E&M-EST. PATIENT-LVL III: CPT | Mod: PBBFAC,,, | Performed by: NURSE PRACTITIONER

## 2017-12-05 PROCEDURE — 99213 OFFICE O/P EST LOW 20 MIN: CPT | Mod: S$GLB,,, | Performed by: NURSE PRACTITIONER

## 2017-12-05 RX ORDER — GABAPENTIN 300 MG/1
CAPSULE ORAL
Qty: 90 CAPSULE | Refills: 2 | Status: SHIPPED | OUTPATIENT
Start: 2017-12-05 | End: 2018-06-01 | Stop reason: ALTCHOICE

## 2017-12-05 RX ORDER — METHOCARBAMOL 750 MG/1
750 TABLET, FILM COATED ORAL 3 TIMES DAILY PRN
Qty: 40 TABLET | Refills: 0 | Status: SHIPPED | OUTPATIENT
Start: 2017-12-05 | End: 2017-12-15

## 2017-12-05 NOTE — PROGRESS NOTES
Chronic patient Established Note (Follow up visit)      SUBJECTIVE:    Renzo Worley presents to the clinic for a follow-up appointment for lower back and right leg pain. He is s/p caudal MALINDA on 11/20/2017. He reports 50% relief of his low back and leg pain. He continues to report right sided buttock pain with intermittent radiation down the back of his leg to his foot. He reports that he is able to sleep better at this time. He reports limited relief of muscle spasms with Zanaflex. He has also tried Flexeril and Parafon with limited benefit. He did go to Urgent care where he received a short supply of Norco which has been beneficial. He continues to participate in a home exercise routine. He denies any other health changes. He denies any bowel or bladder incontinence. His pain today is 5/10.      Pain Disability Index Review:  Last 3 PDI Scores 12/5/2017 11/13/2017 10/18/2017   Pain Disability Index (PDI) 60 53 54       Pain Medications:  Mobic 15 mg QD      Opioid Contract: no     report:  Not applicable    Pain Procedures:   10/5/17 Right L5 and S1 TF MALINDA- 40% relief  11/20/2017- Caudal MALINDA- 50% relief    Physical Therapy/Home Exercise: yes    Imaging:     Lumbar MRI 9/23/17    Narrative     Technique:  Multiplanar, multisequence MR images were performed of the lumbar spine obtained without contrast.     Comparison: None.     Results:    Lumbar spine alignment is within normal limits. The vertebral body heights are well maintained, with no fracture.  No marrow signal abnormality suspicious for an infiltrative process.      The conus medullaris terminates at approximately the L1-L2 disk space.  The adjacent soft tissue structures show no significant abnormalities.  There is disc desiccation and mild to moderate disc space narrowing noted at the L5-S1 level.    L1-L2: No significant central canal or neural foraminal narrowing.    L2-L3: No significant central canal or neural foraminal narrowing.    L3-L4: No  significant central canal or neural foraminal narrowing.    L4-L5:  No significant central canal or neural foraminal narrowing.    L5-S1:  Broad-based prominent central to right paracentral disc protrusion resulting in abutment and effacement of the right S1 nerve root.  No significant central canal narrowing.  The bilateral neural foraminal canals are mildly narrowed.   Impression          1.  Broad-based disc protrusion at the L5-S1 level resulting in effacement of the proximal right S1 nerve root.          Allergies:   Review of patient's allergies indicates:   Allergen Reactions    No known drug allergies        Current Medications:   Current Outpatient Prescriptions   Medication Sig Dispense Refill    albuterol 90 mcg/actuation inhaler Inhale 2 puffs into the lungs every 6 (six) hours as needed for Wheezing. 1 each 11    cyclobenzaprine (FLEXERIL) 10 MG tablet Take 1 tablet (10 mg total) by mouth every evening. 30 tablet 0    hydrocodone-acetaminophen 5-325mg (NORCO) 5-325 mg per tablet Take 1 tablet by mouth every 8 (eight) hours as needed for Pain. 15 tablet 0    meloxicam (MOBIC) 15 MG tablet Take 1 tablet (15 mg total) by mouth once daily. 30 tablet 3    tiZANidine (ZANAFLEX) 4 MG tablet Take 1 tablet (4 mg total) by mouth 4 (four) times daily as needed (muscle pain). 120 tablet 1     No current facility-administered medications for this visit.        REVIEW OF SYSTEMS:    GENERAL:  No weight loss, malaise or fevers.  HEENT:  Negative for frequent or significant headaches.  NECK:  Negative for lumps, goiter, pain and significant neck swelling.  RESPIRATORY:  Negative for cough, wheezing or shortness of breath.  CARDIOVASCULAR:  Negative for chest pain, leg swelling or palpitations.  GI:  Negative for abdominal discomfort, blood in stools or black stools or change in bowel habits.  MUSCULOSKELETAL:  See HPI.  SKIN:  Negative for lesions, rash, and itching.  PSYCH:  Negative for sleep disturbance, mood  "disorder and recent psychosocial stressors.  HEMATOLOGY/LYMPHOLOGY:  Negative for prolonged bleeding, bruising easily or swollen nodes.  NEURO:   No history of headaches, syncope, paralysis, seizures or tremors.  All other reviewed and negative other than HPI.    Past Medical History:  Past Medical History:   Diagnosis Date    Concussion without loss of consciousness 2009    Right sided sciatica 8/2/2017       Past Surgical History:  History reviewed. No pertinent surgical history.    Family History:  Family History   Problem Relation Age of Onset    Hypertension Mother     Migraines Mother     Arthritis Mother     Migraines Sister     Migraines Brother     Multiple sclerosis Maternal Uncle     Cerebral palsy Paternal Aunt     Stroke Maternal Grandfather     Cancer Maternal Grandfather     Cancer Paternal Grandmother     Coronary artery disease Paternal Grandfather     Ulcers Paternal Grandfather        Social History:  Social History     Social History    Marital status: Single     Spouse name: N/A    Number of children: N/A    Years of education: N/A     Social History Main Topics    Smoking status: Current Every Day Smoker     Packs/day: 1.00     Types: Cigarettes    Smokeless tobacco: Never Used    Alcohol use 0.6 oz/week     1 Shots of liquor per week      Comment: occasional    Drug use: No    Sexual activity: Yes     Other Topics Concern    Not on file     Social History Narrative    No narrative on file       OBJECTIVE:    /68   Pulse 93   Temp 99 °F (37.2 °C) (Oral)   Resp 18   Ht 5' 8" (1.727 m)     PHYSICAL EXAMINATION:    General appearance: Well appearing, in no acute distress, alert and oriented x3.  Psych:  Mood and affect appropriate.  Skin: Skin color, texture, turgor normal, no rashes or lesions, in both upper and lower body.  Head/face:  Atraumatic, normocephalic. No palpable lymph nodes  GI: Abdomen soft and non-tender.  Back: Straight leg raising in the sitting " and supine positions is positive to radicular pain at right L5 and S1 distribution at 30 degrees, negative on the left. No pain to palpation over the spine or costovertebral angles. Limited flexion with pain. Negative facet loading bilaterally.  Extremities: Peripheral joint ROM is full and pain free without obvious instability or laxity in all four extremities. No deformities, edema, or skin discoloration. Good capillary refill.  Musculoskeletal: There is pain with palpation to right piriformis muscle.  Piriformis stretch test is negative.  5/5 strength in right ankle with plantar and dorsiflexion. 5/5 strength in left ankle with plantar and dorsiflexion. 5/5 strength with right knee flexion and extension. 5/5 strength with left knee flexion and extension. 4/5 strength in right EHL, 5/5 strength in left EHL.  No atrophy or tone abnormalities are noted.  Neuro: Bilateral upper and lower extremity coordination and muscle stretch reflexes are physiologic and symmetric.  Plantar response are downgoing.   Gait: Antalgic- ambulates without assistance.    ASSESSMENT: 22 y.o. year old male with lower back and right leg pain, consistent with the following diagnoses:     1. Lumbar radiculopathy  Ambulatory consult to Neurosurgery    gabapentin (NEURONTIN) 300 MG capsule   2. Herniated lumbar intervertebral disc  Ambulatory consult to Neurosurgery    gabapentin (NEURONTIN) 300 MG capsule   3. Osteoarthritis of spine with radiculopathy, lumbar region  Ambulatory consult to Neurosurgery    gabapentin (NEURONTIN) 300 MG capsule   4. Chronic pain disorder  Ambulatory consult to Neurosurgery    gabapentin (NEURONTIN) 300 MG capsule   5. Myalgia  methocarbamol (ROBAXIN) 750 MG Tab    Ambulatory consult to Neurosurgery    gabapentin (NEURONTIN) 300 MG capsule         PLAN:     - Previous imaging was reviewed and discussed with the patient today.    - He is s/p caudal MALINDA with 50% relief. We can consider repeating this in the future.      - Consult neurosurgery.     - Trial Gabapentin 300 mg TID. Titration instructions provided.     - Discontinue Zanaflex. Trial Robaxin 750 mg TID PRN muscle pain.     - RTC in 1 month or sooner if needed.     - Counseled patient regarding the importance of constant sleeping habits and physical therapy.    - Dr. Addison was consulted on the patient and agrees with this plan.    The above plan and management options were discussed at length with patient. Patient is in agreement with the above and verbalized understanding.    Frances Rodriguez NP  12/05/2017

## 2017-12-05 NOTE — LETTER
December 5, 2017      Rene Monreal MD  [address] [facility]  [address]  [facility] [address]           [facility]  [address]  [place] [address]  Phone: [phone]  Fax: [phone]          Patient: Renzo Worley   MR Number: [id number]   YOB: 1995   Date of Visit: 12/5/2017       Dear Dr. Rene Monreal:    Thank you for referring Renzo Worley to me for evaluation. Attached you will find relevant portions of my assessment and plan of care.    If you have questions, please do not hesitate to call me. I look forward to following Renzo Worley along with you.    Sincerely,    Frances Rodriguez, NP    Enclosure  CC:  No Recipients    If you would like to receive this communication electronically, please contact [email or web address] or [phone] to request more information on [employer] access.    For providers and/or their staff who would like to refer a patient to [facility], please contact us through our one-stop-shop provider referral line, [facility] , at [phone].    If you feel you have received this communication in error or would no longer like to receive these types of communications, please e-mail [email or web address]

## 2017-12-08 ENCOUNTER — HOSPITAL ENCOUNTER (EMERGENCY)
Facility: HOSPITAL | Age: 22
Discharge: HOME | End: 2017-12-08
Attending: EMERGENCY MEDICINE
Payer: COMMERCIAL

## 2017-12-08 VITALS
DIASTOLIC BLOOD PRESSURE: 70 MMHG | RESPIRATION RATE: 18 BRPM | TEMPERATURE: 99 F | BODY MASS INDEX: 20.53 KG/M2 | HEART RATE: 76 BPM | OXYGEN SATURATION: 100 % | WEIGHT: 135 LBS | SYSTOLIC BLOOD PRESSURE: 154 MMHG

## 2017-12-08 DIAGNOSIS — M54.16 LUMBAR RADICULOPATHY: Primary | ICD-10-CM

## 2017-12-08 PROCEDURE — 25000003 PHARM REV CODE 250: Performed by: PHYSICIAN ASSISTANT

## 2017-12-08 PROCEDURE — 99283 EMERGENCY DEPT VISIT LOW MDM: CPT | Mod: ,,, | Performed by: PHYSICIAN ASSISTANT

## 2017-12-08 PROCEDURE — 63600175 PHARM REV CODE 636 W HCPCS: Performed by: PHYSICIAN ASSISTANT

## 2017-12-08 PROCEDURE — 99283 EMERGENCY DEPT VISIT LOW MDM: CPT

## 2017-12-08 RX ORDER — KETOROLAC TROMETHAMINE 10 MG/1
10 TABLET, FILM COATED ORAL
Status: COMPLETED | OUTPATIENT
Start: 2017-12-08 | End: 2017-12-08

## 2017-12-08 RX ORDER — KETOROLAC TROMETHAMINE 10 MG/1
10 TABLET, FILM COATED ORAL EVERY 6 HOURS
Qty: 28 TABLET | Refills: 0 | Status: SHIPPED | OUTPATIENT
Start: 2017-12-08 | End: 2017-12-11 | Stop reason: SDUPTHER

## 2017-12-08 RX ORDER — KETOROLAC TROMETHAMINE 30 MG/ML
15 INJECTION, SOLUTION INTRAMUSCULAR; INTRAVENOUS
Status: DISCONTINUED | OUTPATIENT
Start: 2017-12-08 | End: 2017-12-08

## 2017-12-08 RX ORDER — ORPHENADRINE CITRATE 30 MG/ML
60 INJECTION INTRAMUSCULAR; INTRAVENOUS
Status: DISCONTINUED | OUTPATIENT
Start: 2017-12-08 | End: 2017-12-08

## 2017-12-08 RX ORDER — PREDNISONE 20 MG/1
60 TABLET ORAL
Status: COMPLETED | OUTPATIENT
Start: 2017-12-08 | End: 2017-12-08

## 2017-12-08 RX ORDER — METHYLPREDNISOLONE 4 MG/1
TABLET ORAL
Qty: 1 PACKAGE | Refills: 0 | Status: SHIPPED | OUTPATIENT
Start: 2017-12-08 | End: 2017-12-29

## 2017-12-08 RX ORDER — METHOCARBAMOL 750 MG/1
750 TABLET, FILM COATED ORAL
Status: COMPLETED | OUTPATIENT
Start: 2017-12-08 | End: 2017-12-08

## 2017-12-08 RX ADMIN — METHOCARBAMOL 750 MG: 750 TABLET ORAL at 05:12

## 2017-12-08 RX ADMIN — KETOROLAC TROMETHAMINE 10 MG: 10 TABLET, FILM COATED ORAL at 05:12

## 2017-12-08 RX ADMIN — PREDNISONE 60 MG: 20 TABLET ORAL at 05:12

## 2017-12-08 NOTE — ED PROVIDER NOTES
Encounter Date: 12/8/2017    SCRIBE #1 NOTE: I, Foster Walker, am scribing for, and in the presence of,  Dr. Hunter. I have scribed the following portions of the note - the APC attestation.       History     Chief Complaint   Patient presents with    Back Pain     Patient is a 22 year old male with PMHx of herniated lumbar disc, lumbar radiculopathy, and osteoarthritis. He presents to the ED for low back pain. He reports increased right buttock pain over the last week due to known radiculopathy. Reports associated lower extremity weakness on right side. He denies recent falls or direct trauma. He denies urinary/fecal incontinence or paresthesias. He denies fever,chills, nausea, vomiting, sob, chest pain, abd pain, dysuria, diarrhea, or constipation. He is a non smoker and denies alcohol use.             Review of patient's allergies indicates:   Allergen Reactions    No known drug allergies      Past Medical History:   Diagnosis Date    Concussion without loss of consciousness 2009    Right sided sciatica 8/2/2017     History reviewed. No pertinent surgical history.  Family History   Problem Relation Age of Onset    Hypertension Mother     Migraines Mother     Arthritis Mother     Migraines Sister     Migraines Brother     Multiple sclerosis Maternal Uncle     Cerebral palsy Paternal Aunt     Stroke Maternal Grandfather     Cancer Maternal Grandfather     Cancer Paternal Grandmother     Coronary artery disease Paternal Grandfather     Ulcers Paternal Grandfather      Social History   Substance Use Topics    Smoking status: Current Every Day Smoker     Packs/day: 1.00     Types: Cigarettes    Smokeless tobacco: Never Used    Alcohol use 0.6 oz/week     1 Shots of liquor per week      Comment: occasional     Review of Systems   Constitutional: Negative for fever.   HENT: Negative for sore throat.    Respiratory: Negative for shortness of breath.    Cardiovascular: Negative for chest pain.    Gastrointestinal: Negative for nausea.   Genitourinary: Negative for dysuria.   Musculoskeletal: Positive for back pain.   Skin: Negative for rash.   Neurological: Negative for weakness.   Hematological: Does not bruise/bleed easily.       Physical Exam     Initial Vitals [12/08/17 1628]   BP Pulse Resp Temp SpO2   (!) 154/70 76 18 98.7 °F (37.1 °C) 100 %      MAP       98         Physical Exam    Vitals reviewed.  Constitutional: He appears well-developed and well-nourished.   Patient resting comfortably in NAD on RA.    HENT:   Head: Normocephalic and atraumatic.   Nose: Nose normal.   Eyes: Conjunctivae and EOM are normal. Pupils are equal, round, and reactive to light.   Neck: Normal range of motion.   Cardiovascular: Normal rate and regular rhythm. Exam reveals no friction rub.    No murmur heard.  Pulmonary/Chest: Breath sounds normal. No respiratory distress. He has no wheezes. He has no rales.   Abdominal: Soft. Bowel sounds are normal. He exhibits no distension. There is no tenderness.   Musculoskeletal: Normal range of motion.   TTP over right gluteal region, reproducible on exam.    Neurological: He is alert and oriented to person, place, and time. He has normal strength. No sensory deficit. Gait normal.   Reflex Scores:       Patellar reflexes are 2+ on the right side and 3+ on the left side.       Achilles reflexes are 2+ on the right side and 3+ on the left side.  Motor strength of B/L UE and LE 5/5.    Skin: Skin is warm and dry. No erythema.   Psychiatric: He has a normal mood and affect. Thought content normal.         ED Course   Procedures  Labs Reviewed - No data to display                APC / Resident Notes:   Patient is a 22 year old male with PMHx of herniated lumbar disc, lumbar radiculopathy, and osteoarthritis. He presents to the ED for low back pain.  Patient is in no acute distress. Vitals stable. AAOx3. RRR. Lungs CTA. Abdomen is soft, non tender, non distended. Skin is normal  appearance without rashes.     Will provide symptomatic pain relief with Toradol, robaxin, and steroid by mouth. Will continue to monitor.     Differential diagnoses include, but are not limited to: herniated lumbar disc disease, lumbar radiculopathy, sacroiliac joint dysfunction, or lumbar strain. I considered but do not suspect cauda equina syndrome or spinal epidural abscess.     Will discharge home with steroid dose pack and Toradol as need for break through pain. Instructed patient to discontinue meloxicam while taking Toradol. Instructed patient of return precautions to ED. Patient verbalizes understanding. Will discharge with F/U with spine clinic and PCP.     I have discussed and reviewed with my supervising physician.           Scribe Attestation:   Scribe #1: I performed the above scribed service and the documentation accurately describes the services I performed. I attest to the accuracy of the note.    Attending Attestation:     Physician Attestation Statement for NP/PA:   I discussed this assessment and plan of this patient with the NP/PA, but I did not personally examine the patient. The face to face encounter was performed by the NP/PA.                  ED Course      Clinical Impression:   The encounter diagnosis was Lumbar radiculopathy.    Disposition:   Disposition: Discharged  Condition: Stable                        Kathryn Trivedi PA-C  12/08/17 [id number]

## 2017-12-08 NOTE — ED TRIAGE NOTES
Patient states he has right hip pain due to a slipped disk and has not been able to sleep all week.  Patient using crutches to walk.

## 2017-12-10 ENCOUNTER — PATIENT MESSAGE (OUTPATIENT)
Dept: FAMILY MEDICINE | Facility: CLINIC | Age: 22
End: 2017-12-10

## 2017-12-11 ENCOUNTER — OFFICE VISIT (OUTPATIENT)
Dept: FAMILY MEDICINE | Facility: CLINIC | Age: 22
End: 2017-12-11
Payer: COMMERCIAL

## 2017-12-11 VITALS
WEIGHT: 133.38 LBS | DIASTOLIC BLOOD PRESSURE: 60 MMHG | SYSTOLIC BLOOD PRESSURE: 117 MMHG | HEIGHT: 68 IN | HEART RATE: 92 BPM | BODY MASS INDEX: 20.21 KG/M2 | TEMPERATURE: 99 F

## 2017-12-11 DIAGNOSIS — M51.26 RUPTURED LUMBAR DISC: Primary | ICD-10-CM

## 2017-12-11 PROCEDURE — 99214 OFFICE O/P EST MOD 30 MIN: CPT | Mod: S$GLB,,, | Performed by: FAMILY MEDICINE

## 2017-12-11 PROCEDURE — 99999 PR PBB SHADOW E&M-EST. PATIENT-LVL IV: CPT | Mod: PBBFAC,,, | Performed by: FAMILY MEDICINE

## 2017-12-11 RX ORDER — KETOROLAC TROMETHAMINE 10 MG/1
10 TABLET, FILM COATED ORAL EVERY 6 HOURS
Qty: 28 TABLET | Refills: 0 | Status: SHIPPED | OUTPATIENT
Start: 2017-12-11 | End: 2017-12-18 | Stop reason: SDUPTHER

## 2017-12-11 RX ORDER — HYDROCODONE BITARTRATE AND ACETAMINOPHEN 5; 325 MG/1; MG/1
1 TABLET ORAL EVERY 6 HOURS PRN
Qty: 30 TABLET | Refills: 0 | Status: SHIPPED | OUTPATIENT
Start: 2017-12-11 | End: 2018-01-16 | Stop reason: ALTCHOICE

## 2017-12-11 NOTE — PROGRESS NOTES
Subjective:       Patient ID: Renzo Worley is a 22 y.o. male.    Chief Complaint: Sciatica    Disclaimer: This note has been generated using voice-recognition software. There may be typographical errors that have been missed during proof-reading    21 yo presents with mother today for follow up of severe right lower back pain, which he describes as 9-10/10.  Recently seen in ED, prior history of ruptured disc at L5-S1.  Pt seen by Pain Management with no improvement following MALINDA x 2.  He was recently started on Gabapentin 300mg qhs with plans for gradual increase.  He is also on Robaxin, Toradol and Medrol dosepak. Pain refers to right lower extremity, no numbness but has noted weakness.  No urinary or fecal incontinence.      Review of Systems   Neurological: Positive for weakness. Negative for numbness.       Objective:      Physical Exam   Musculoskeletal:   Tender to palpation right lower back/gluteal area at about L5  Positive straight leg raise on right     Neurological: He displays abnormal reflex.   Reflex Scores:       Patellar reflexes are 2+ on the right side and 2+ on the left side.       Achilles reflexes are 0 on the right side and 2+ on the left side.      Assessment:       1. Ruptured lumbar disc        Plan:        1.  Continue present medications, increase Gabapentin to bid  2.  Norco, #30, no refills  3.  Consult NS department

## 2017-12-14 ENCOUNTER — PATIENT MESSAGE (OUTPATIENT)
Dept: NEUROSURGERY | Facility: CLINIC | Age: 22
End: 2017-12-14

## 2017-12-15 ENCOUNTER — TELEPHONE (OUTPATIENT)
Dept: NEUROSURGERY | Facility: CLINIC | Age: 22
End: 2017-12-15

## 2017-12-15 NOTE — TELEPHONE ENCOUNTER
Called patient on 12/14/2017 @ 4:30 no answer message left for patient that his appointment with Dr. Ken for Monday 12/18/2017, had been changed. Patient was also sent a message via [employer] 12/14/2017. Patient was called again 12/15/2017 at 10:57, no answer message left confirming appointment changes.

## 2017-12-18 RX ORDER — KETOROLAC TROMETHAMINE 10 MG/1
10 TABLET, FILM COATED ORAL EVERY 6 HOURS
Qty: 28 TABLET | Refills: 0 | Status: SHIPPED | OUTPATIENT
Start: 2017-12-18 | End: 2018-01-02 | Stop reason: SDUPTHER

## 2017-12-18 RX ORDER — HYDROCODONE BITARTRATE AND ACETAMINOPHEN 5; 325 MG/1; MG/1
1 TABLET ORAL EVERY 6 HOURS PRN
Qty: 30 TABLET | Refills: 0 | Status: CANCELLED | OUTPATIENT
Start: 2017-12-18

## 2017-12-18 NOTE — TELEPHONE ENCOUNTER
Please inform pt that I can refill the Toradol medication but not the Hydrocodone.  He is overusing it and needs to cut back since it is highly addictive.  He needs to increase Gabapentin to three times daily if needed, thanks  He should also schedule a follow up with Pain Management if not bettere

## 2017-12-26 ENCOUNTER — PATIENT MESSAGE (OUTPATIENT)
Dept: FAMILY MEDICINE | Facility: CLINIC | Age: 22
End: 2017-12-26

## 2017-12-28 ENCOUNTER — TELEPHONE (OUTPATIENT)
Dept: SPINE | Facility: CLINIC | Age: 22
End: 2017-12-28

## 2017-12-28 NOTE — TELEPHONE ENCOUNTER
----- Message from Sera Arrington, RN sent at 12/27/2017  3:29 PM CST -----  Looks like he is scheduled with [facility]. Can you call and set him up an appt with Elvia or Luisa on a Yaron day? Thanks!  ----- Message -----  From: Blanche Saenzjohn  Sent: 12/27/2017   6:30 AM  To: [facility] Staff    Pt contacted me and asked that I request an appointment with Dr. Marrufo as Dr. Monreal would like pt to be seen by him instead of the physician he was scheduled with.  Please contact pt @  Lyndsay Worley (Mother)[phone]

## 2018-01-02 ENCOUNTER — PATIENT MESSAGE (OUTPATIENT)
Dept: FAMILY MEDICINE | Facility: CLINIC | Age: 23
End: 2018-01-02

## 2018-01-02 RX ORDER — KETOROLAC TROMETHAMINE 10 MG/1
10 TABLET, FILM COATED ORAL EVERY 6 HOURS
Qty: 28 TABLET | Refills: 0 | Status: SHIPPED | OUTPATIENT
Start: 2018-01-02 | End: 2018-01-09

## 2018-01-04 ENCOUNTER — PATIENT MESSAGE (OUTPATIENT)
Dept: FAMILY MEDICINE | Facility: CLINIC | Age: 23
End: 2018-01-04

## 2018-01-08 ENCOUNTER — OFFICE VISIT (OUTPATIENT)
Dept: NEUROSURGERY | Facility: CLINIC | Age: 23
End: 2018-01-08
Payer: COMMERCIAL

## 2018-01-08 VITALS
SYSTOLIC BLOOD PRESSURE: 119 MMHG | HEART RATE: 72 BPM | TEMPERATURE: 100 F | DIASTOLIC BLOOD PRESSURE: 66 MMHG | WEIGHT: 129.13 LBS | BODY MASS INDEX: 19.57 KG/M2 | HEIGHT: 68 IN

## 2018-01-08 DIAGNOSIS — M54.31 RIGHT SIDED SCIATICA: ICD-10-CM

## 2018-01-08 DIAGNOSIS — M54.16 LUMBAR RADICULOPATHY, CHRONIC: Primary | ICD-10-CM

## 2018-01-08 DIAGNOSIS — M51.26 HERNIATED LUMBAR INTERVERTEBRAL DISC: ICD-10-CM

## 2018-01-08 PROCEDURE — 99999 PR PBB SHADOW E&M-EST. PATIENT-LVL III: CPT | Mod: PBBFAC,,, | Performed by: NEUROLOGICAL SURGERY

## 2018-01-08 PROCEDURE — 99244 OFF/OP CNSLTJ NEW/EST MOD 40: CPT | Mod: 57,S$GLB,, | Performed by: NEUROLOGICAL SURGERY

## 2018-01-08 RX ORDER — CHLORZOXAZONE 500 MG/1
500 TABLET ORAL 4 TIMES DAILY PRN
COMMUNITY
Start: 2017-11-18 | End: 2018-01-19 | Stop reason: SDUPTHER

## 2018-01-08 NOTE — PROGRESS NOTES
History & Physical    SUBJECTIVE:     Chief Complaint: Right leg radicular pain.    History of Present Illness:  Mr. Worley is a 22-year-old male who is referred to me by Frances Rodriguez NP.  His past medical history is significant for ADHD and concussion.  He complains of a several month history of mainly right leg pain.  He denies back pain.  His pain starts in his right buttocks and travels down the posterior aspect of his leg into his right calf.  He sometimes has pain in the sole of his right foot.  He denies any numbness or tingling.  He denies any left leg pain.  He denies any bowel or bladder difficulty.  He had an MRI in September 2017 showing a L5-S1 herniated disc.  He was treated conservatively with physical therapy and epidural steroid injections.  He states that the steroid injections helped for about a week each time he had one done.  However, the pain then returned.  The pain has gotten so severe that it is interfering with his daily life.  He is no longer able to work secondary to the pain.  He is here today to discuss surgical options.    Review of patient's allergies indicates:   Allergen Reactions    No known drug allergies        Current Outpatient Prescriptions   Medication Sig Dispense Refill    chlorzoxazone (PARAFON FORTE) 500 mg Tab       gabapentin (NEURONTIN) 300 MG capsule Start with one tablet at night for 7 days, then increase to one tablet twice a day for 7 days, then increase to one tablet three times daily 90 capsule 2    hydrocodone-acetaminophen 5-325mg (NORCO) 5-325 mg per tablet Take 1 tablet by mouth every 6 (six) hours as needed for Pain. 30 tablet 0    ketorolac (TORADOL) 10 mg tablet Take 1 tablet (10 mg total) by mouth every 6 (six) hours. 28 tablet 0    albuterol 90 mcg/actuation inhaler Inhale 2 puffs into the lungs every 6 (six) hours as needed for Wheezing. 1 each 11     No current facility-administered medications for this visit.        Past Medical History:    Diagnosis Date    Concussion without loss of consciousness 2009    Right sided sciatica 8/2/2017     History reviewed. No pertinent surgical history.  Family History     Problem Relation (Age of Onset)    Arthritis Mother    Cancer Maternal Grandfather, Paternal Grandmother    Cerebral palsy Paternal Aunt    Coronary artery disease Paternal Grandfather    Hypertension Mother    Migraines Mother, Sister, Brother    Multiple sclerosis Maternal Uncle    Stroke Maternal Grandfather    Ulcers Paternal Grandfather        Social History     Social History    Marital status: Single     Spouse name: N/A    Number of children: N/A    Years of education: N/A     Social History Main Topics    Smoking status: Current Every Day Smoker     Packs/day: 1.00     Types: Cigarettes    Smokeless tobacco: Never Used    Alcohol use 0.6 oz/week     1 Shots of liquor per week      Comment: occasional    Drug use: No    Sexual activity: Yes     Other Topics Concern    None     Social History Narrative    None       Review of Systems:  Review of Systems   Constitutional: Positive for appetite change, fatigue and unexpected weight change. Negative for activity change, diaphoresis and fever.   Respiratory: Negative for cough, shortness of breath and wheezing.    Cardiovascular: Negative for chest pain and palpitations.   Gastrointestinal: Positive for nausea and vomiting. Negative for abdominal distention, abdominal pain, blood in stool, constipation and diarrhea.   Genitourinary: Negative for difficulty urinating, enuresis, frequency and urgency.   Musculoskeletal: Positive for back pain. Negative for gait problem, joint swelling, myalgias, neck pain and neck stiffness.   Skin: Negative for color change, rash and wound.   Allergic/Immunologic: Negative for environmental allergies and food allergies.   Neurological: Negative for dizziness, seizures, facial asymmetry, speech difficulty, weakness, light-headedness, numbness and  "headaches.   Hematological: Does not bruise/bleed easily.   Psychiatric/Behavioral: Negative for agitation, behavioral problems, dysphoric mood and hallucinations. The patient is not nervous/anxious.        OBJECTIVE:     Vital Signs  Temp: 99.5 °F (37.5 °C)  Pulse: 72  BP: 119/66  Pain Score:   4 (right side)  Height: 5' 8" (172.7 cm)  Weight: 58.6 kg (129 lb 1.6 oz)  Body mass index is 19.63 kg/m².      Physical Exam:  Physical Exam:    Constitutional: He appears well-developed and well-nourished. No distress.     Eyes: Pupils are equal, round, and reactive to light. Conjunctivae and EOM are normal.     Cardiovascular: Normal rate, regular rhythm, normal pulses and no edema.     Abdominal: Soft. Bowel sounds are normal.     Skin: Skin displays no rash on trunk and no rash on extremities. Skin displays no lesions on trunk and no lesions on extremities.     Psych/Behavior: He is alert. He is oriented to person, place, and time. He has a normal mood and affect.     Musculoskeletal: Gait is normal.        Neck: Range of motion is full. There is no tenderness. Muscle strength is 5/5. Tone is normal.        Back: Range of motion is full. There is no tenderness. Muscle strength is 5/5. Tone is normal.        Right Upper Extremities: Range of motion is full. There is no tenderness. Muscle strength is 5/5. Tone is normal.        Left Upper Extremities: Range of motion is full. There is no tenderness. Muscle strength is 5/5. Tone is normal.       Right Lower Extremities: Range of motion is full. There is no tenderness. Muscle strength is 5/5. Tone is normal.        Left Lower Extremities: Range of motion is full. There is no tenderness. Muscle strength is 5/5. Tone is normal.     Neurological:        Coordination: He has a normal Romberg Test, normal finger to nose coordination and normal tandem walking coordination.        Sensory: There is no sensory deficit in the trunk. There is no sensory deficit in the extremities.    "     DTRs: DTRs are DTRS NORMAL AND SYMMETRICnormal and symmetric. He displays no Babinski's sign on the right side. He displays no Babinski's sign on the left side.        Cranial nerves: Cranial nerve(s) II, III, IV, V, VI, VII, VIII, IX, X, XI and XII are intact.         Diagnostic Results:  He has an MRI of the lumbar spine available for review which I personally reviewed.  This shows good alignment of the lumbar spine.  At L5-S1 or is loss of disc space height with a herniated disc eccentric to the right at this level.  There is right L5-S1 neural foraminal narrowing.  The remainder of the levels are intact.    ASSESSMENT/PLAN:     Mr. Worley is a 22-year-old male with right S1 radiculopathy from a right L5-S1 herniated disc.  He has tried conservative therapy and failed.  I do believe he has a good candidate for a right L5-S1 microdiscectomy.  I explained the procedure in detail to the patient as well as the risks and benefits and pros and cons.  The patient understands that this is not urgent but given his lack of pain relief from conservative therapies wishes to proceed at this time.  We'll get all the appropriate preoperative testing and schedule surgery in the near future.  He knows he can call with any further questions or concerns.        Note dictated with voice recognition software, please excuse any grammatical errors.

## 2018-01-08 NOTE — LETTER
January 8, 2018      Frances Rodriguez, NP  [address]  [address]  [facility] [address]           [facility] - Neurosurgery 7th Fl  [address]  [place] [address]  Phone: [phone]          Patient: Renzo Worley   MR Number: [id number]   YOB: 1995   Date of Visit: 1/8/2018       Dear Frances Rodriguez:    Thank you for referring Renzo Worley to me for evaluation. Attached you will find relevant portions of my assessment and plan of care.    If you have questions, please do not hesitate to call me. I look forward to following Renzo Worley along with you.    Sincerely,    Lorie Ken MD    Enclosure  CC:  No Recipients    If you would like to receive this communication electronically, please contact [email or web address] or [phone] to request more information on [facility] Link access.    For providers and/or their staff who would like to refer a patient to [facility], please contact us through our one-stop-shop provider referral line, [facility], at [phone].    If you feel you have received this communication in error or would no longer like to receive these types of communications, please e-mail [email or web address]

## 2018-01-10 ENCOUNTER — TELEPHONE (OUTPATIENT)
Dept: NEUROSURGERY | Facility: CLINIC | Age: 23
End: 2018-01-10

## 2018-01-10 DIAGNOSIS — M54.16 LUMBAR RADICULOPATHY: ICD-10-CM

## 2018-01-10 DIAGNOSIS — M51.26 HERNIATED LUMBAR DISC WITHOUT MYELOPATHY: Primary | ICD-10-CM

## 2018-01-16 ENCOUNTER — TELEPHONE (OUTPATIENT)
Dept: NEUROSURGERY | Facility: CLINIC | Age: 23
End: 2018-01-16

## 2018-01-16 ENCOUNTER — ANESTHESIA EVENT (OUTPATIENT)
Dept: SURGERY | Facility: HOSPITAL | Age: 23
End: 2018-01-16
Payer: COMMERCIAL

## 2018-01-16 NOTE — ANESTHESIA PREPROCEDURE EVALUATION
[facility]  Anesthesia Pre-Operative Evaluation         Patient Name: Renzo Worley  YOB: 1995  MRN: [id number]    SUBJECTIVE:     Pre-operative evaluation for Procedure(s) (LRB):  L5-S1/MICRODISKECTOMY-MINIMALLY INVASIVE (Right)  Scheduled for 1/17/2018    HPI 01/16/2018:  Renzo Worley is a 22 y.o. male with hx of ADHD, concussion, current 1ppd smoking, herniated L5-S1 disc, R lumbar radiculopathy, and osteoarthritis.    Patient presents for the above procedure(s).    Prev airway:   No prior records in Epic or Legacy Documents.    Oxygen/Ventilation Requirements:  On room air       Patient Active Problem List   Diagnosis    ADHD (attention deficit hyperactivity disorder)    Anxiety    Right hip pain    Right sided sciatica    Chronic pain    Lumbar radiculopathy, chronic    Herniated lumbar intervertebral disc       Review of patient's allergies indicates:   Allergen Reactions    No known drug allergies        Outpatient Medications:  No current facility-administered medications on file prior to encounter.      Current Outpatient Prescriptions on File Prior to Encounter   Medication Sig Dispense Refill    albuterol 90 mcg/actuation inhaler Inhale 2 puffs into the lungs every 6 (six) hours as needed for Wheezing. 1 each 11    chlorzoxazone (PARAFON FORTE) 500 mg Tab       gabapentin (NEURONTIN) 300 MG capsule Start with one tablet at night for 7 days, then increase to one tablet twice a day for 7 days, then increase to one tablet three times daily 90 capsule 2    hydrocodone-acetaminophen 5-325mg (NORCO) 5-325 mg per tablet Take 1 tablet by mouth every 6 (six) hours as needed for Pain. 30 tablet 0        Current Inpatient Medications:      No past surgical history on file.    Social History     Social History    Marital status: Single     Spouse name: N/A    Number of children: N/A    Years of education: N/A     Occupational History    Not on file.     Social  History Main Topics    Smoking status: Current Every Day Smoker     Packs/day: 1.00     Types: Cigarettes    Smokeless tobacco: Never Used    Alcohol use 0.6 oz/week     1 Shots of liquor per week      Comment: occasional    Drug use: No    Sexual activity: Yes     Other Topics Concern    Not on file     Social History Narrative    No narrative on file       OBJECTIVE:     Weight:  Wt Readings from Last 4 Encounters:   01/08/18 58.6 kg (129 lb 1.6 oz)   12/11/17 60.5 kg (133 lb 6.1 oz)   12/08/17 61.2 kg (135 lb)   12/03/17 61.2 kg (135 lb)       Vital Signs Range (Last 24H):         CBC:   No results for input(s): WBC, RBC, HGB, HCT, PLT, MCV, MCH, MCHC in the last 72 hours.    CMP: No results for input(s): NA, K, CL, CO2, BUN, CREATININE, GLU, MG, PHOS, CALCIUM, ALBUMIN, PROT, ALKPHOS, ALT, AST, BILITOT in the last 72 hours.    INR:  No results for input(s): INR, PROTIME, APTT in the last 72 hours.    Diagnostic Studies:  CXR 10/9/2017  Lung volumes are normal and symmetric.  Mediastinal structures are midline.    I detect no convincing evidence of pulmonary disease, pleural fluid, lymph node enlargement, cardiac decompensation, pneumothorax, pneumomediastinum, pneumoperitoneum or significant osseous abnormality.    MRI Lumbar Spine wo Contrast 9/23/2017  Broad-based disc protrusion at the L5-S1 level resulting in effacement of the proximal right S1 nerve root.     EKG:  none     2D Echo:  No results found for this or any previous visit.      ASSESSMENT/PLAN:         Anesthesia Evaluation    I have reviewed the Patient Summary Reports.      I have reviewed the Medications.     Review of Systems  Anesthesia Hx:  No problems with previous Anesthesia Denies Hx of Anesthetic complications  Neg history of prior surgery. Denies Family Hx of Anesthesia complications.   Denies Personal Hx of Anesthesia complications.   Social:  No Alcohol Use, Smoker    Hematology/Oncology:  Hematology Normal   Oncology Normal      EENT/Dental:EENT/Dental Normal   Cardiovascular:   Exercise tolerance: good Denies Pacemaker.  Denies Hypertension.  Denies Valvular problems/Murmurs.  Denies MI.  Denies CAD.    Denies CABG/stent.  Denies Dysrhythmias.   Denies Angina.         ECG has been reviewed.    Pulmonary:  Pulmonary Normal  Denies COPD.  Denies Asthma.  Denies Sleep Apnea.    Renal/:  Renal/ Normal  Denies Chronic Renal Disease.     Hepatic/GI:  Hepatic/GI Normal  Denies GERD. Denies Liver Disease.    Musculoskeletal:  Musculoskeletal Normal Herniated disc   Neurological:  Neurology Normal  Denies CVA. Denies Seizures. R lumbar radiculopathy   Endocrine:  Endocrine Normal Denies Diabetes. Denies Hypothyroidism.    Dermatological:  Skin Normal    Psych:   Psychiatric History (ADHD)          Physical Exam  General:  Well nourished    Airway/Jaw/Neck:  Airway Findings: Mouth Opening: Normal Tongue: Normal  General Airway Assessment: Adult  Mallampati: II  TM Distance: Normal, at least 6 cm  Jaw/Neck Findings:  Neck ROM: Normal ROM  Neck Findings: Normal    Eyes/Ears/Nose:  EYES/EARS/NOSE FINDINGS: Normal   Dental:  Dental Findings: In tact   Chest/Lungs:  Chest/Lungs Findings: Clear to auscultation, Normal Respiratory Rate     Heart/Vascular:  Heart Findings: Rate: Normal  Rhythm: Regular Rhythm  Sounds: Normal  Heart murmur: negative       Mental Status:  Mental Status Findings:  Cooperative, Alert and Oriented         Anesthesia Plan  Type of Anesthesia, risks & benefits discussed:  Anesthesia Type:  general, MAC  Patient's Preference:   Intra-op Monitoring Plan: standard ASA monitors  Intra-op Monitoring Plan Comments:   Post Op Pain Control Plan: multimodal analgesia, IV/PO Opioids PRN and per primary service following discharge from PACU  Post Op Pain Control Plan Comments:   Induction:   IV  Beta Blocker:  Patient is not currently on a Beta-Blocker (No further documentation required).       Informed Consent: Patient understands  risks and agrees with Anesthesia plan.  Questions answered. Anesthesia consent signed with patient.  ASA Score: 2     Day of Surgery Review of History & Physical: I have interviewed and examined the patient. I have reviewed the patient's H&P dated:  There are no significant changes.  H&P update referred to the surgeon.         Ready For Surgery From Anesthesia Perspective.

## 2018-01-16 NOTE — TELEPHONE ENCOUNTER
Spoke with patient's mother informed her of her son's arrival time for surgery. Patient's mother was informed that her son needs to arrive for 6am for his surgery. Patient's mother was also informed that her son she fast after midnight the night before surgery. Patient's mother was also informed that her son should bath in Hibiclens or Dial Antibacteria soap the night before, and the morning of surgery.

## 2018-01-16 NOTE — PRE-PROCEDURE INSTRUCTIONS
Spoke with Patient's Mother - Lyndsay.  NPO, medication, and pre-op instructions reviewed.  Verified Renzo's phone number which is correct.  This is Renzo's first experience with Anesthesia.  Mother has PONV.   Stated that he has lost weight and has been having pain for months.  Mother verbalized understanding of instructions.

## 2018-01-17 ENCOUNTER — ANESTHESIA (OUTPATIENT)
Dept: SURGERY | Facility: HOSPITAL | Age: 23
End: 2018-01-17
Payer: COMMERCIAL

## 2018-01-17 ENCOUNTER — HOSPITAL ENCOUNTER (OUTPATIENT)
Facility: HOSPITAL | Age: 23
Discharge: HOME | End: 2018-01-17
Attending: NEUROLOGICAL SURGERY | Admitting: NEUROLOGICAL SURGERY
Payer: COMMERCIAL

## 2018-01-17 ENCOUNTER — SURGERY (OUTPATIENT)
Age: 23
End: 2018-01-17

## 2018-01-17 VITALS
DIASTOLIC BLOOD PRESSURE: 72 MMHG | RESPIRATION RATE: 16 BRPM | HEIGHT: 68 IN | SYSTOLIC BLOOD PRESSURE: 131 MMHG | HEART RATE: 75 BPM | BODY MASS INDEX: 20.46 KG/M2 | OXYGEN SATURATION: 100 % | WEIGHT: 135 LBS | TEMPERATURE: 98 F

## 2018-01-17 DIAGNOSIS — M51.9 LUMBAR DISC DISEASE: Primary | ICD-10-CM

## 2018-01-17 LAB
ABO + RH BLD: NORMAL
ANION GAP SERPL CALC-SCNC: 10 MMOL/L
APTT BLDCRRT: 24.2 SEC
BASOPHILS # BLD AUTO: 0.11 K/UL
BASOPHILS NFR BLD: 1.1 %
BLD GP AB SCN CELLS X3 SERPL QL: NORMAL
BUN SERPL-MCNC: 18 MG/DL
CALCIUM SERPL-MCNC: 9.8 MG/DL
CHLORIDE SERPL-SCNC: 103 MMOL/L
CO2 SERPL-SCNC: 28 MMOL/L
CREAT SERPL-MCNC: 1 MG/DL
DIFFERENTIAL METHOD: NORMAL
EOSINOPHIL # BLD AUTO: 0 K/UL
EOSINOPHIL NFR BLD: 0.1 %
ERYTHROCYTE [DISTWIDTH] IN BLOOD BY AUTOMATED COUNT: 12.5 %
EST. GFR  (AFRICAN AMERICAN): >60 ML/MIN/1.73 M^2
EST. GFR  (NON AFRICAN AMERICAN): >60 ML/MIN/1.73 M^2
GLUCOSE SERPL-MCNC: 90 MG/DL
HCT VFR BLD AUTO: 42.7 %
HGB BLD-MCNC: 14.5 G/DL
IMM GRANULOCYTES # BLD AUTO: 0.01 K/UL
IMM GRANULOCYTES NFR BLD AUTO: 0.1 %
INR PPP: 1
LYMPHOCYTES # BLD AUTO: 3.3 K/UL
LYMPHOCYTES NFR BLD: 34 %
MCH RBC QN AUTO: 29.8 PG
MCHC RBC AUTO-ENTMCNC: 34 G/DL
MCV RBC AUTO: 88 FL
MONOCYTES # BLD AUTO: 0.7 K/UL
MONOCYTES NFR BLD: 7 %
NEUTROPHILS # BLD AUTO: 5.6 K/UL
NEUTROPHILS NFR BLD: 57.7 %
NRBC BLD-RTO: 0 /100 WBC
PLATELET # BLD AUTO: 199 K/UL
PMV BLD AUTO: 10.7 FL
POTASSIUM SERPL-SCNC: 3.8 MMOL/L
PROTHROMBIN TIME: 10.6 SEC
RBC # BLD AUTO: 4.86 M/UL
SODIUM SERPL-SCNC: 141 MMOL/L
WBC # BLD AUTO: 9.77 K/UL

## 2018-01-17 PROCEDURE — 27000221 HC OXYGEN, UP TO 24 HOURS

## 2018-01-17 PROCEDURE — 25000003 PHARM REV CODE 250: Performed by: NEUROLOGICAL SURGERY

## 2018-01-17 PROCEDURE — 36000711: Performed by: NEUROLOGICAL SURGERY

## 2018-01-17 PROCEDURE — 85025 COMPLETE CBC W/AUTO DIFF WBC: CPT

## 2018-01-17 PROCEDURE — 27201423 OPTIME MED/SURG SUP & DEVICES STERILE SUPPLY: Performed by: NEUROLOGICAL SURGERY

## 2018-01-17 PROCEDURE — 85730 THROMBOPLASTIN TIME PARTIAL: CPT

## 2018-01-17 PROCEDURE — D9220A PRA ANESTHESIA: Mod: ,,, | Performed by: ANESTHESIOLOGY

## 2018-01-17 PROCEDURE — 80048 BASIC METABOLIC PNL TOTAL CA: CPT

## 2018-01-17 PROCEDURE — 27201037 HC PRESSURE MONITORING SET UP

## 2018-01-17 PROCEDURE — 71000015 HC POSTOP RECOV 1ST HR: Performed by: NEUROLOGICAL SURGERY

## 2018-01-17 PROCEDURE — 63600175 PHARM REV CODE 636 W HCPCS: Performed by: STUDENT IN AN ORGANIZED HEALTH CARE EDUCATION/TRAINING PROGRAM

## 2018-01-17 PROCEDURE — 71000039 HC RECOVERY, EACH ADD'L HOUR: Performed by: NEUROLOGICAL SURGERY

## 2018-01-17 PROCEDURE — 94761 N-INVAS EAR/PLS OXIMETRY MLT: CPT

## 2018-01-17 PROCEDURE — 63600175 PHARM REV CODE 636 W HCPCS: Performed by: NEUROLOGICAL SURGERY

## 2018-01-17 PROCEDURE — 36000710: Performed by: NEUROLOGICAL SURGERY

## 2018-01-17 PROCEDURE — 63030 LAMOT DCMPRN NRV RT 1 LMBR: CPT | Mod: RT,,, | Performed by: NEUROLOGICAL SURGERY

## 2018-01-17 PROCEDURE — 71000033 HC RECOVERY, INTIAL HOUR: Performed by: NEUROLOGICAL SURGERY

## 2018-01-17 PROCEDURE — 25000003 PHARM REV CODE 250: Performed by: STUDENT IN AN ORGANIZED HEALTH CARE EDUCATION/TRAINING PROGRAM

## 2018-01-17 PROCEDURE — 37000008 HC ANESTHESIA 1ST 15 MINUTES: Performed by: NEUROLOGICAL SURGERY

## 2018-01-17 PROCEDURE — C9290 INJ, BUPIVACAINE LIPOSOME: HCPCS | Performed by: NEUROLOGICAL SURGERY

## 2018-01-17 PROCEDURE — 85610 PROTHROMBIN TIME: CPT

## 2018-01-17 PROCEDURE — 86850 RBC ANTIBODY SCREEN: CPT

## 2018-01-17 PROCEDURE — 37000009 HC ANESTHESIA EA ADD 15 MINS: Performed by: NEUROLOGICAL SURGERY

## 2018-01-17 RX ORDER — ONDANSETRON 2 MG/ML
INJECTION INTRAMUSCULAR; INTRAVENOUS
Status: DISCONTINUED | OUTPATIENT
Start: 2018-01-17 | End: 2018-01-17

## 2018-01-17 RX ORDER — PHENYLEPHRINE HYDROCHLORIDE 10 MG/ML
INJECTION INTRAVENOUS
Status: DISCONTINUED | OUTPATIENT
Start: 2018-01-17 | End: 2018-01-17

## 2018-01-17 RX ORDER — FENTANYL CITRATE 50 UG/ML
25 INJECTION, SOLUTION INTRAMUSCULAR; INTRAVENOUS EVERY 5 MIN PRN
Status: DISCONTINUED | OUTPATIENT
Start: 2018-01-17 | End: 2018-01-17 | Stop reason: HOSPADM

## 2018-01-17 RX ORDER — KETAMINE HYDROCHLORIDE 100 MG/ML
INJECTION, SOLUTION INTRAMUSCULAR; INTRAVENOUS
Status: DISCONTINUED | OUTPATIENT
Start: 2018-01-17 | End: 2018-01-17

## 2018-01-17 RX ORDER — ONDANSETRON 2 MG/ML
4 INJECTION INTRAMUSCULAR; INTRAVENOUS ONCE AS NEEDED
Status: DISCONTINUED | OUTPATIENT
Start: 2018-01-17 | End: 2018-01-17 | Stop reason: HOSPADM

## 2018-01-17 RX ORDER — CEPHALEXIN 500 MG/1
500 CAPSULE ORAL EVERY 6 HOURS
Qty: 20 CAPSULE | Refills: 0 | Status: SHIPPED | OUTPATIENT
Start: 2018-01-17 | End: 2018-01-22

## 2018-01-17 RX ORDER — HYDROCODONE BITARTRATE AND ACETAMINOPHEN 5; 325 MG/1; MG/1
1 TABLET ORAL EVERY 6 HOURS PRN
Qty: 40 TABLET | Refills: 0 | Status: SHIPPED | OUTPATIENT
Start: 2018-01-17 | End: 2018-02-02 | Stop reason: SDUPTHER

## 2018-01-17 RX ORDER — SODIUM CHLORIDE 9 MG/ML
INJECTION, SOLUTION INTRAVENOUS CONTINUOUS PRN
Status: DISCONTINUED | OUTPATIENT
Start: 2018-01-17 | End: 2018-01-17

## 2018-01-17 RX ORDER — PROPOFOL 10 MG/ML
VIAL (ML) INTRAVENOUS
Status: DISCONTINUED | OUTPATIENT
Start: 2018-01-17 | End: 2018-01-17

## 2018-01-17 RX ORDER — CEFAZOLIN SODIUM 1 G/3ML
2 INJECTION, POWDER, FOR SOLUTION INTRAMUSCULAR; INTRAVENOUS
Status: COMPLETED | OUTPATIENT
Start: 2018-01-17 | End: 2018-01-17

## 2018-01-17 RX ORDER — ROCURONIUM BROMIDE 10 MG/ML
INJECTION, SOLUTION INTRAVENOUS
Status: DISCONTINUED | OUTPATIENT
Start: 2018-01-17 | End: 2018-01-17

## 2018-01-17 RX ORDER — MUPIROCIN 20 MG/G
OINTMENT TOPICAL
Status: DISCONTINUED | OUTPATIENT
Start: 2018-01-17 | End: 2018-01-17 | Stop reason: HOSPADM

## 2018-01-17 RX ORDER — SODIUM CHLORIDE 0.9 % (FLUSH) 0.9 %
3 SYRINGE (ML) INJECTION
Status: DISCONTINUED | OUTPATIENT
Start: 2018-01-17 | End: 2018-01-17 | Stop reason: HOSPADM

## 2018-01-17 RX ORDER — METHYLPREDNISOLONE ACETATE 40 MG/ML
INJECTION, SUSPENSION INTRA-ARTICULAR; INTRALESIONAL; INTRAMUSCULAR; SOFT TISSUE
Status: DISCONTINUED | OUTPATIENT
Start: 2018-01-17 | End: 2018-01-17 | Stop reason: HOSPADM

## 2018-01-17 RX ORDER — LIDOCAINE HYDROCHLORIDE AND EPINEPHRINE 10; 10 MG/ML; UG/ML
INJECTION, SOLUTION INFILTRATION; PERINEURAL
Status: DISCONTINUED | OUTPATIENT
Start: 2018-01-17 | End: 2018-01-17 | Stop reason: HOSPADM

## 2018-01-17 RX ORDER — DIPHENHYDRAMINE HYDROCHLORIDE 50 MG/ML
25 INJECTION INTRAMUSCULAR; INTRAVENOUS EVERY 6 HOURS PRN
Status: DISCONTINUED | OUTPATIENT
Start: 2018-01-17 | End: 2018-01-17 | Stop reason: HOSPADM

## 2018-01-17 RX ORDER — MUPIROCIN 20 MG/G
1 OINTMENT TOPICAL
Status: DISCONTINUED | OUTPATIENT
Start: 2018-01-17 | End: 2018-01-17 | Stop reason: HOSPADM

## 2018-01-17 RX ORDER — BACITRACIN 50000 [IU]/1
INJECTION, POWDER, FOR SOLUTION INTRAMUSCULAR
Status: DISCONTINUED | OUTPATIENT
Start: 2018-01-17 | End: 2018-01-17 | Stop reason: HOSPADM

## 2018-01-17 RX ORDER — FENTANYL CITRATE 50 UG/ML
INJECTION, SOLUTION INTRAMUSCULAR; INTRAVENOUS
Status: DISCONTINUED | OUTPATIENT
Start: 2018-01-17 | End: 2018-01-17

## 2018-01-17 RX ORDER — BUPIVACAINE HYDROCHLORIDE AND EPINEPHRINE 5; 5 MG/ML; UG/ML
INJECTION, SOLUTION EPIDURAL; INTRACAUDAL; PERINEURAL
Status: DISCONTINUED | OUTPATIENT
Start: 2018-01-17 | End: 2018-01-17 | Stop reason: HOSPADM

## 2018-01-17 RX ORDER — SUCCINYLCHOLINE CHLORIDE 20 MG/ML
INJECTION INTRAMUSCULAR; INTRAVENOUS
Status: DISCONTINUED | OUTPATIENT
Start: 2018-01-17 | End: 2018-01-17

## 2018-01-17 RX ORDER — DEXAMETHASONE SODIUM PHOSPHATE 4 MG/ML
INJECTION, SOLUTION INTRA-ARTICULAR; INTRALESIONAL; INTRAMUSCULAR; INTRAVENOUS; SOFT TISSUE
Status: DISCONTINUED | OUTPATIENT
Start: 2018-01-17 | End: 2018-01-17

## 2018-01-17 RX ORDER — LIDOCAINE HCL/PF 100 MG/5ML
SYRINGE (ML) INTRAVENOUS
Status: DISCONTINUED | OUTPATIENT
Start: 2018-01-17 | End: 2018-01-17

## 2018-01-17 RX ORDER — MIDAZOLAM HYDROCHLORIDE 5 MG/ML
INJECTION INTRAMUSCULAR; INTRAVENOUS
Status: DISCONTINUED | OUTPATIENT
Start: 2018-01-17 | End: 2018-01-17

## 2018-01-17 RX ORDER — HYDROCODONE BITARTRATE AND ACETAMINOPHEN 5; 325 MG/1; MG/1
1 TABLET ORAL EVERY 6 HOURS PRN
Status: DISCONTINUED | OUTPATIENT
Start: 2018-01-17 | End: 2018-01-17 | Stop reason: HOSPADM

## 2018-01-17 RX ADMIN — GELATIN ABSORBABLE SPONGE SIZE 100 1 APPLICATOR: MISC at 09:01

## 2018-01-17 RX ADMIN — PHENYLEPHRINE HYDROCHLORIDE 100 MCG: 10 INJECTION INTRAVENOUS at 10:01

## 2018-01-17 RX ADMIN — EPHEDRINE SULFATE 5 MG: 50 INJECTION, SOLUTION INTRAMUSCULAR; INTRAVENOUS; SUBCUTANEOUS at 09:01

## 2018-01-17 RX ADMIN — REMIFENTANIL HYDROCHLORIDE 0.2 MCG/KG/MIN: 1 INJECTION, POWDER, LYOPHILIZED, FOR SOLUTION INTRAVENOUS at 08:01

## 2018-01-17 RX ADMIN — SODIUM CHLORIDE: 0.9 INJECTION, SOLUTION INTRAVENOUS at 07:01

## 2018-01-17 RX ADMIN — LIDOCAINE HYDROCHLORIDE AND EPINEPHRINE 10 ML: 10; 10 INJECTION, SOLUTION INFILTRATION; PERINEURAL at 10:01

## 2018-01-17 RX ADMIN — DEXAMETHASONE SODIUM PHOSPHATE 8 MG: 4 INJECTION, SOLUTION INTRAMUSCULAR; INTRAVENOUS at 09:01

## 2018-01-17 RX ADMIN — THROMBIN, TOPICAL (BOVINE) 5000 UNITS: KIT at 09:01

## 2018-01-17 RX ADMIN — BUPIVACAINE HYDROCHLORIDE AND EPINEPHRINE BITARTRATE 30 ML: 5; .0091 INJECTION, SOLUTION EPIDURAL; INTRACAUDAL; PERINEURAL at 10:01

## 2018-01-17 RX ADMIN — PHENYLEPHRINE HYDROCHLORIDE 100 MCG: 10 INJECTION INTRAVENOUS at 09:01

## 2018-01-17 RX ADMIN — EPHEDRINE SULFATE 5 MG: 50 INJECTION, SOLUTION INTRAMUSCULAR; INTRAVENOUS; SUBCUTANEOUS at 08:01

## 2018-01-17 RX ADMIN — LIDOCAINE HYDROCHLORIDE 60 MG: 20 INJECTION, SOLUTION INTRAVENOUS at 08:01

## 2018-01-17 RX ADMIN — FENTANYL CITRATE 100 MCG: 50 INJECTION, SOLUTION INTRAMUSCULAR; INTRAVENOUS at 08:01

## 2018-01-17 RX ADMIN — SUCCINYLCHOLINE CHLORIDE 120 MG: 20 INJECTION, SOLUTION INTRAMUSCULAR; INTRAVENOUS at 08:01

## 2018-01-17 RX ADMIN — CEFAZOLIN 2 G: 330 INJECTION, POWDER, FOR SOLUTION INTRAMUSCULAR; INTRAVENOUS at 08:01

## 2018-01-17 RX ADMIN — BUPIVACAINE 20 ML: 13.3 INJECTION, SUSPENSION, LIPOSOMAL INFILTRATION at 10:01

## 2018-01-17 RX ADMIN — ROCURONIUM BROMIDE 5 MG: 10 INJECTION, SOLUTION INTRAVENOUS at 08:01

## 2018-01-17 RX ADMIN — PROPOFOL 50 MG: 10 INJECTION, EMULSION INTRAVENOUS at 08:01

## 2018-01-17 RX ADMIN — PROPOFOL 150 MG: 10 INJECTION, EMULSION INTRAVENOUS at 08:01

## 2018-01-17 RX ADMIN — ONDANSETRON 4 MG: 2 INJECTION INTRAMUSCULAR; INTRAVENOUS at 10:01

## 2018-01-17 RX ADMIN — HYDROCODONE BITARTRATE AND ACETAMINOPHEN 1 TABLET: 5; 325 TABLET ORAL at 11:01

## 2018-01-17 RX ADMIN — SODIUM CHLORIDE: 0.9 INJECTION, SOLUTION INTRAVENOUS at 10:01

## 2018-01-17 RX ADMIN — BACITRACIN 50000 UNITS: 50000 INJECTION, POWDER, LYOPHILIZED, FOR SOLUTION INTRAMUSCULAR at 09:01

## 2018-01-17 RX ADMIN — SODIUM CHLORIDE: 0.9 INJECTION, SOLUTION INTRAVENOUS at 08:01

## 2018-01-17 RX ADMIN — PHENYLEPHRINE HYDROCHLORIDE 100 MCG: 10 INJECTION INTRAVENOUS at 08:01

## 2018-01-17 RX ADMIN — MIDAZOLAM 2 MG: 5 INJECTION INTRAMUSCULAR; INTRAVENOUS at 08:01

## 2018-01-17 RX ADMIN — METHYLPREDNISOLONE ACETATE 40 MG: 40 INJECTION, SUSPENSION INTRA-ARTICULAR; INTRALESIONAL; INTRAMUSCULAR; SOFT TISSUE at 10:01

## 2018-01-17 RX ADMIN — KETAMINE HYDROCHLORIDE 30 MG: 100 INJECTION, SOLUTION, CONCENTRATE INTRAMUSCULAR; INTRAVENOUS at 08:01

## 2018-01-17 NOTE — DISCHARGE INSTRUCTIONS
Please follow ONLY the instructions that are checked below.    Activity Restrictions:  [x]  Return to work will be determined on an individual basis.  [x]  No lifting greater than 10 pounds.  [x]  Avoid bending and twisting the area of your surgery more than 45 degrees from neutral position in any direction.  [x]  No driving or operating machinery:  [x]  until cleared by your surgeon.  [x]  while taking narcotic pain medications or muscle relaxants.  [x]  No cervical collar, soft collar, or lumbar brace required.  []  Wear your brace at all times. You may be given an extra brace or soft collar to wear when showering.  []  Wear your brace at all times except when flat in bed.  []  Wear brace for comfort only.  [x]  Increase ambulation over the next 2 weeks so that you are walking 2 miles per day at 2 weeks post-operatively.  [x]  Walk on paved surfaces only. It is okay to walk up and down stairs while holding onto a side rail.  [x]  No sexual activity for 2-3 weeks.    Discharge Medication/Follow-up:  [x]  Please refer to discharge medication reconciliation form.  [x]  Do not take ANY non-steroidal anti-inflammatory drugs (NSAIDS), including the following: ibuprofen, naprosyn, Aleve, Advil, Indocin, Mobic, or Celebrex for:  [x]  4 weeks  []  8 weeks  []  6 months  [x]  Prescriptions for appropriate medication will be given upon discharge.   [x]  Pain control: norco           [x]  Take docusate (Colace 100 mg): take one capsule a day as needed for constipation. You can get this over the counter.  [x]  Follow-up appointment:  [x]  10-14 days post-op for wound check by physician assistant/nurse  [x]  4-6 weeks with MD:  []  with x-rays  [x]  without x-rays  []  An appointment will be mailed to you.    Wound Care:  [x]  Remove dressing or bandaid in  2  days.  []  No bandage required. Keep your incision open to the air.  [x]  You may shower on the 2nd day after your surgery. Have the force of water hit you opposite from  the incision. Pat the incision dry after your shower; do not scrub the incision.  [x]  You cannot take a bath until 8 weeks after surgery.  []  Apply bacitracin to incision twice a day for    more days.    Call your doctor or go to the Emergency Room for any signs of infection, including: increased redness, drainage, pain, or fever (temperature ?101.5 for 24 hours). Call your doctor or go to the Emergency Room if there are any localized neurological changes; problems with speech, vision, numbness, tingling, weakness, or severe headache; or for other concerns.    Special Instructions:  [x]  No use of tobacco products.  [x]  Diet: Please eat a regular diet as tolerated.  []  Other diet:              Specific physician instructions:           Physicians need 3 days' notice for pain medicine to be refilled. Pain medicine will only be refilled between 8 AM and 5 PM, Monday through Friday, due to Food and Drug Administration regulation of documentation.    If you have any questions about this form, please call [phone].    Form No. 25840 (Revised 10/31/2013)      Discharge Instructions: After Your Surgery  Youve just had surgery. During surgery, you were given medicine called anesthesia to keep you relaxed and free of pain. After surgery, you may have some pain or nausea. This is common. Here are some tips for feeling better and getting well after surgery.     Stay on schedule with your medicine.   Going home  Your healthcare provider will show you how to take care of yourself when you go home. He or she will also answer your questions. Have an adult family member or friend drive you home. For the first 24 hours after your surgery:  · Do not drive or use heavy equipment.  · Do not make important decisions or sign legal papers.  · Do not drink alcohol.  · Have someone stay with you, if needed. He or she can watch for problems and help keep you safe.  Be sure to go to all follow-up visits with your healthcare provider.  And rest after your surgery for as long as your healthcare provider tells you to.  Coping with pain  If you have pain after surgery, pain medicine will help you feel better. Take it as told, before pain becomes severe. Also, ask your healthcare provider or pharmacist about other ways to control pain. This might be with heat, ice, or relaxation. And follow any other instructions your surgeon or nurse gives you.  Tips for taking pain medicine  To get the best relief possible, remember these points:  · Pain medicines can upset your stomach. Taking them with a little food may help.  · Most pain relievers taken by mouth need at least 20 to 30 minutes to start to work.  · Taking medicine on a schedule can help you remember to take it. Try to time your medicine so that you can take it before starting an activity. This might be before you get dressed, go for a walk, or sit down for dinner.  · Constipation is a common side effect of pain medicines. Call your healthcare provider before taking any medicines such as laxatives or stool softeners to help ease constipation. Also ask if you should skip any foods. Drinking lots of fluids and eating foods such as fruits and vegetables that are high in fiber can also help. Remember, do not take laxatives unless your surgeon has prescribed them.  · Drinking alcohol and taking pain medicine can cause dizziness and slow your breathing. It can even be deadly. Do not drink alcohol while taking pain medicine.  · Pain medicine can make you react more slowly to things. Do not drive or run machinery while taking pain medicine.  Your healthcare provider may tell you to take acetaminophen to help ease your pain. Ask him or her how much you are supposed to take each day. Acetaminophen or other pain relievers may interact with your prescription medicines or other over-the-counter (OTC) medicines. Some prescription medicines have acetaminophen and other ingredients. Using both prescription and OTC  acetaminophen for pain can cause you to overdose. Read the labels on your OTC medicines with care. This will help you to clearly know the list of ingredients, how much to take, and any warnings. It may also help you not take too much acetaminophen. If you have questions or do not understand the information, ask your pharmacist or healthcare provider to explain it to you before you take the OTC medicine.  Managing nausea  Some people have an upset stomach after surgery. This is often because of anesthesia, pain, or pain medicine, or the stress of surgery. These tips will help you handle nausea and eat healthy foods as you get better. If you were on a special food plan before surgery, ask your healthcare provider if you should follow it while you get better. These tips may help:  · Do not push yourself to eat. Your body will tell you when to eat and how much.  · Start off with clear liquids and soup. They are easier to digest.  · Next try semi-solid foods, such as mashed potatoes, applesauce, and gelatin, as you feel ready.  · Slowly move to solid foods. Dont eat fatty, rich, or spicy foods at first.  · Do not force yourself to have 3 large meals a day. Instead eat smaller amounts more often.  · Take pain medicines with a small amount of solid food, such as crackers or toast, to avoid nausea.     Call your surgeon if  · You still have pain an hour after taking medicine. The medicine may not be strong enough.  · You feel too sleepy, dizzy, or groggy. The medicine may be too strong.  · You have side effects like nausea, vomiting, or skin changes, such as rash, itching, or hives.       If you have obstructive sleep apnea  You were given anesthesia medicine during surgery to keep you comfortable and free of pain. After surgery, you may have more apnea spells because of this medicine and other medicines you were given. The spells may last longer than usual.   At home:  · Keep using the continuous positive airway pressure  (CPAP) device when you sleep. Unless your healthcare provider tells you not to, use it when you sleep, day or night. CPAP is a common device used to treat obstructive sleep apnea.  · Talk with your provider before taking any pain medicine, muscle relaxants, or sedatives. Your provider will tell you about the possible dangers of taking these medicines.  Date Last Reviewed: 12/1/2016  © 3187-9618 [employer]. [address] [place] [address], [place] [address]. All rights reserved. This information is not intended as a substitute for professional medical care. Always follow your healthcare professional's instructions.

## 2018-01-17 NOTE — OP NOTE
DATE OF PROCEDURE: 1/17/2018     PREOPERATIVE DIAGNOSES:   Lumbar radiculopathy, right L5-S1 Herniated lumbar disc.    POSTOPERATIVE DIAGNOSES:   Lumbar radiculopathy, right L5-S1 Herniated lumbar disc.    PROCEDURES PERFORMED:   Minimally invasive right L5-S1 hemilaminotomy, foraminotomy   and microdiskectomy.     Surgeon(s) and Role:     * Lorie Ken MD - Primary     * Cee Canales MD - Resident - Assisting    ANESTHESIA: General    ESTIMATED BLOOD LOSS: 50 mL.    INDICATION FOR PROCEDURE: Renzo Worley is a 22 y.o. male who   complains of a several month history of right leg pain without back pain.   The pain is in the S1 distribution. Imaging studies show a large right L5-S1   herniated disk with nerve root compression. After failing conservative therapy,   the decision was made to take the patient to the operating room for a   minimally invasive microdiscectomy.      OPERATIVE NOTE: After obtaining informed consent, the patient was brought   into the Operating Room. he was intubated and anesthetized by Anesthesia.   Preoperative antibiotics as well as dexamethasone were administered. The   patient was placed prone on the four post Henri table. Neuromonitoring   needles were placed and baselines were obtained. The back was then prepped   and draped in the standard sterile fashion. Fluoroscopy was brought into the   field to confirm the appropriate level on the right side at the L5-S1 facet joint.   At this level, an approximately 2 cm paramedian incision was made at the   level of the L5 lamina. The METRx tube was parked on the L5 lamina and   the right L5-S1 facet joint. Once the METRx tube was in position, we brought   in the operating microscope. Using microsurgical technique, soft tissue was   removed overlying the lamina and the facet joint of L5-S1. We were able to   identify the inferior aspect of the L5 lamina and the medial aspect of the right   L5-S1 facet joint. We then used a high-speed drill  to perform an ipsilateral   laminotomy and partial medial facetectomy. Ligamentum flavum was   identified and Kerrison rongeurs were then used to remove the ligamentum   flavum. The thecal sac and nerve root were identified and the nerve root was   retracted medially. There were significant engorged epidural veins, which were   cauterized using bipolar electrocautery. The disk space was identified and a   #15 blade was used to incise the disk space. The diskectomy was performed  in the normal fashion using curettes and pituitary rongeurs. Once we were   satisfied with the diskectomy, the disk space was again probed to ensure there   were no remaining disk fragments that were left behind. A foraminotomy was   performed using Kerrison rongeurs to ensure there was no compression of the  nerve root. Once we were happy with the decompression, a Thais was used   to pass underneath the nerve root and into the foramen, which all seemed to be   free from compression. Hemostasis was obtained using FloSeal and bipolar   electrocautery. The wound was thoroughly irrigated. Depo-Medrol was placed in   the wound over the dura. This was followed by a piece of Gelfoam. The METRx   tube was removed and the wound was closed in layers.The patient was flipped   back supine onto the stretcher and extubated by Anesthesia. He was brought to   the Recovery Room in stable condition. All counts were correct at the end of the   case and neuromonitoring remained stable throughout the case.

## 2018-01-17 NOTE — TRANSFER OF CARE
"Anesthesia Transfer of Care Note    Patient: Renzo Worley    Procedure(s) Performed: Procedure(s) (LRB):  L5-S1/MICRODISKECTOMY-MINIMALLY INVASIVE (Right)    Patient location: PACU    Anesthesia Type: general    Transport from OR: Transported from OR on 6-10 L/min O2 by face mask with adequate spontaneous ventilation    Post pain: adequate analgesia    Post assessment: no apparent anesthetic complications    Post vital signs: stable    Level of consciousness: awake and alert    Nausea/Vomiting: no nausea/vomiting    Complications: none    Transfer of care protocol was followed      Last vitals:   Visit Vitals  /76   Pulse 98   Temp 36.3 °C (97.3 °F) (Oral)   Resp 13   Ht 5' 8" (1.727 m)   Wt 61.2 kg (135 lb)   SpO2 100%   BMI 20.53 kg/m²     "

## 2018-01-17 NOTE — PLAN OF CARE
Pt waiting on mother to fill prescription downstairs before he is wheeled to front entrance. No care needed at this time

## 2018-01-17 NOTE — INTERVAL H&P NOTE
The patient has been examined and the H&P has been reviewed:    I concur with the findings and no changes have occurred since H&P was written.    Anesthesia/Surgery risks, benefits and alternative options discussed and understood by patient/family.          Active Hospital Problems    Diagnosis  POA    Lumbar disc disease [M51.9]  Yes      Resolved Hospital Problems    Diagnosis Date Resolved POA   No resolved problems to display.

## 2018-01-17 NOTE — BRIEF OP NOTE
[facility]  Brief Operative Note     SUMMARY     Surgery Date: 1/17/2018     Surgeon(s) and Role:     * Lorie Ken MD - Primary     * Cee Canales MD - Resident - Assisting        Pre-op Diagnosis:  Lumbar radiculopathy [M54.16]  Herniated lumbar disc without myelopathy [M51.26]    Post-op Diagnosis:  Post-Op Diagnosis Codes:     * Lumbar radiculopathy [M54.16]     * Herniated lumbar disc without myelopathy [M51.26]    Procedure(s) (LRB):  L5-S1/MICRODISKECTOMY-MINIMALLY INVASIVE (Right)    Anesthesia: General    Description of the findings of the procedure: R MIS L5-S1 microdisc    Findings/Key Components: see dictation    Estimated Blood Loss: * No values recorded between 1/17/2018  9:21 AM and 1/17/2018 11:03 AM *         Specimens:   Specimen (12h ago through future)    None          Discharge Note    SUMMARY     Admit Date: 1/17/2018    Discharge Date and Time:  01/17/2018 11:07 AM    Hospital Course (synopsis of major diagnoses, care, treatment, and services provided during the course of the hospital stay): Renzo Worley is a 22 y.o. male who was found to have radiculopathy 2/2 a disc herniation. He tolerated the procedure well and was discharged home in stable condition.      Final Diagnosis: Post-Op Diagnosis Codes:     * Lumbar radiculopathy [M54.16]     * Herniated lumbar disc without myelopathy [M51.26]    Disposition: Home or Self Care    Follow Up/Patient Instructions:     Medications:  Reconciled Home Medications:   Current Discharge Medication List      START taking these medications    Details   cephALEXin (KEFLEX) 500 MG capsule Take 1 capsule (500 mg total) by mouth every 6 (six) hours.  Qty: 20 capsule, Refills: 0      hydrocodone-acetaminophen 5-325mg (NORCO) 5-325 mg per tablet Take 1 tablet by mouth every 6 (six) hours as needed for Pain.  Qty: 40 tablet, Refills: 0         CONTINUE these medications which have NOT CHANGED    Details   chlorzoxazone (PARAFON FORTE) 500  mg Tab Take 500 mg by mouth 4 (four) times daily as needed.       gabapentin (NEURONTIN) 300 MG capsule Start with one tablet at night for 7 days, then increase to one tablet twice a day for 7 days, then increase to one tablet three times daily  Qty: 90 capsule, Refills: 2    Associated Diagnoses: Lumbar radiculopathy; Herniated lumbar intervertebral disc; Osteoarthritis of spine with radiculopathy, lumbar region; Chronic pain disorder; Myalgia             Discharge Procedure Orders  Diet Adult Regular     Activity as tolerated     Lifting restrictions   Order Comments: No lifting > 10lbs     No driving until:   Order Comments: Off all narcotics and cleared in clinic     Notify your health care provider if you experience any of the following:  temperature >100.4     Notify your health care provider if you experience any of the following:  persistent nausea and vomiting or diarrhea     Notify your health care provider if you experience any of the following:  severe uncontrolled pain     Notify your health care provider if you experience any of the following:  redness, tenderness, or signs of infection (pain, swelling, redness, odor or green/yellow discharge around incision site)     Notify your health care provider if you experience any of the following:  difficulty breathing or increased cough     Notify your health care provider if you experience any of the following:  worsening rash     Notify your health care provider if you experience any of the following:  severe persistent headache     Notify your health care provider if you experience any of the following:  persistent dizziness, light-headedness, or visual disturbances     Notify your health care provider if you experience any of the following:  increased confusion or weakness     Remove dressing in 48 hours   Order Comments: Remove outer dressing in 48 hours. There are steri strips underneath. Cover with Saran wrap while showering for 14 days, then wash off  in shower. Do not soak incision under water (tub, pool). If incision becomes wet, gently pat dry; do not rub.       Follow-up Information     Lorie Ken MD.    Specialty:  Neurosurgery  Why:  As scheduled for wound check   Contact information:  [address]  [facility] [address]  [phone]

## 2018-01-18 ENCOUNTER — PATIENT MESSAGE (OUTPATIENT)
Dept: FAMILY MEDICINE | Facility: CLINIC | Age: 23
End: 2018-01-18

## 2018-01-19 RX ORDER — CHLORZOXAZONE 500 MG/1
500 TABLET ORAL 4 TIMES DAILY PRN
Qty: 30 TABLET | Refills: 1 | Status: SHIPPED | OUTPATIENT
Start: 2018-01-19 | End: 2018-03-19 | Stop reason: SDUPTHER

## 2018-01-21 NOTE — ANESTHESIA POSTPROCEDURE EVALUATION
"Anesthesia Post Evaluation    Patient: Renzo Worley    Procedure(s) Performed: Procedure(s) (LRB):  L5-S1/MICRODISKECTOMY-MINIMALLY INVASIVE (Right)    Final Anesthesia Type: general  Patient location during evaluation: PACU  Patient participation: Yes- Able to Participate  Level of consciousness: awake and alert and oriented  Post-procedure vital signs: reviewed and stable  Pain management: adequate  Airway patency: patent  PONV status at discharge: No PONV  Anesthetic complications: no      Cardiovascular status: hemodynamically stable  Respiratory status: unassisted, spontaneous ventilation and room air  Hydration status: euvolemic  Follow-up not needed.        Visit Vitals  /72   Pulse 75   Temp 36.5 °C (97.7 °F) (Temporal)   Resp 16   Ht 5' 8" (1.727 m)   Wt 61.2 kg (135 lb)   SpO2 100%   BMI 20.53 kg/m²       Pain/Amy Score: No Data Recorded      "

## 2018-01-28 ENCOUNTER — PATIENT MESSAGE (OUTPATIENT)
Dept: NEUROSURGERY | Facility: CLINIC | Age: 23
End: 2018-01-28

## 2018-02-02 ENCOUNTER — CLINICAL SUPPORT (OUTPATIENT)
Dept: NEUROSURGERY | Facility: CLINIC | Age: 23
End: 2018-02-02
Payer: COMMERCIAL

## 2018-02-02 VITALS
BODY MASS INDEX: 20.46 KG/M2 | HEIGHT: 68 IN | HEART RATE: 67 BPM | DIASTOLIC BLOOD PRESSURE: 41 MMHG | TEMPERATURE: 99 F | WEIGHT: 135 LBS | SYSTOLIC BLOOD PRESSURE: 102 MMHG

## 2018-02-02 PROCEDURE — 99999 PR PBB SHADOW E&M-EST. PATIENT-LVL III: CPT | Mod: PBBFAC,,,

## 2018-02-02 RX ORDER — HYDROCODONE BITARTRATE AND ACETAMINOPHEN 5; 325 MG/1; MG/1
1 TABLET ORAL EVERY 6 HOURS PRN
Qty: 40 TABLET | Refills: 0 | Status: SHIPPED | OUTPATIENT
Start: 2018-02-02 | End: 2018-06-01 | Stop reason: ALTCHOICE

## 2018-02-02 NOTE — PROGRESS NOTES
Neurosurgery Wound Check Progress Note     Patient seen in clinic for 2 week post op s/p l5-S1 hemilaminectomy, foraminotomy, and microdiscectomy Dr Ken on 01/17/2018.       Incision on back assessed, is healed, no swelling or purulent drainage, edges well approximated.     Patient was instructed as follows:    Discontinue Bacitracin after tonight.   May shower normally but pat dry after shower.   Keep incision clean, dry and open to air as much as possible.   Patient encouraged to walk as much as possible but advised to walk with family member or friend and rest as necessary.   No lifting >10lbs.   Post-op instructions reviewed with emphasis on body mechanics.   Return to work will be determined on an individual basis.   No driving or operating machinery while taking narcotic pain medication or muscle relaxants and until cleared by a surgeon.   Refilled pain medication   Advised patient NOT to swim, golf, or go to chiropractor until released br Dr Ken    All questions were answered. Patient will follow up with Dr Ken 02/26/2018. Patient was encouraged to call clinic with any future concerns prior to follow up appt. If any worsening symptoms, patient should report to ED.       Kaitlin Fagan RN, BSN  Neurosurgery

## 2018-02-12 ENCOUNTER — PATIENT MESSAGE (OUTPATIENT)
Dept: NEUROSURGERY | Facility: CLINIC | Age: 23
End: 2018-02-12

## 2018-02-26 ENCOUNTER — OFFICE VISIT (OUTPATIENT)
Dept: NEUROSURGERY | Facility: CLINIC | Age: 23
End: 2018-02-26
Payer: COMMERCIAL

## 2018-02-26 VITALS
SYSTOLIC BLOOD PRESSURE: 116 MMHG | HEART RATE: 75 BPM | HEIGHT: 68 IN | DIASTOLIC BLOOD PRESSURE: 68 MMHG | WEIGHT: 135 LBS | BODY MASS INDEX: 20.46 KG/M2

## 2018-02-26 DIAGNOSIS — M54.31 RIGHT SIDED SCIATICA: Primary | ICD-10-CM

## 2018-02-26 DIAGNOSIS — M51.26 HERNIATED LUMBAR INTERVERTEBRAL DISC: ICD-10-CM

## 2018-02-26 DIAGNOSIS — M54.16 LUMBAR RADICULOPATHY, CHRONIC: ICD-10-CM

## 2018-02-26 PROCEDURE — 99999 PR PBB SHADOW E&M-EST. PATIENT-LVL III: CPT | Mod: PBBFAC,,, | Performed by: NEUROLOGICAL SURGERY

## 2018-02-26 PROCEDURE — 99024 POSTOP FOLLOW-UP VISIT: CPT | Mod: S$GLB,,, | Performed by: NEUROLOGICAL SURGERY

## 2018-02-26 NOTE — PROGRESS NOTES
Established [facility]    SUBJECTIVE:     History of Present Illness:   Mr Worley 22-year-old male who is seeing me today in follow-up he underwent a right L5-S1 minimally invasive microdiscectomy on January 17, 2018.  Preoperatively he complained of a several month history of right leg pain without back pain in the S1 distribution.  Imaging study showed a large right L5-S1 herniated disc with nerve root compression.  Postoperatively, he has done well.  He reports complete resolution of his leg pain.  He complains of some muscle spasms in the midthoracic spine, but no low back pain or spasms.    Review of patient's allergies indicates:   Allergen Reactions    No known drug allergies        Current Outpatient Prescriptions   Medication Sig Dispense Refill    chlorzoxazone (PARAFON FORTE) 500 mg Tab Take 1 tablet (500 mg total) by mouth 4 (four) times daily as needed. 30 tablet 1    hydrocodone-acetaminophen 5-325mg (NORCO) 5-325 mg per tablet Take 1 tablet by mouth every 6 (six) hours as needed for Pain. 40 tablet 0    gabapentin (NEURONTIN) 300 MG capsule Start with one tablet at night for 7 days, then increase to one tablet twice a day for 7 days, then increase to one tablet three times daily 90 capsule 2     No current facility-administered medications for this visit.        Past Medical History:   Diagnosis Date    ADHD     Anxiety     Concussion without loss of consciousness 2009    Right sided sciatica 8/2/2017     History reviewed. No pertinent surgical history.  Family History     Problem Relation (Age of Onset)    Arthritis Mother    Cancer Maternal Grandfather, Paternal Grandmother    Cerebral palsy Paternal Aunt    Coronary artery disease Paternal Grandfather    Hypertension Mother    Migraines Mother, Sister, Brother    Multiple sclerosis Maternal Uncle    Stroke Maternal Grandfather    Ulcers Paternal Grandfather        Social History     Social History    Marital status: Single     Spouse name:  "N/A    Number of children: N/A    Years of education: N/A     Social History Main Topics    Smoking status: Current Every Day Smoker     Packs/day: 1.00     Types: Cigarettes    Smokeless tobacco: Never Used    Alcohol use 0.6 oz/week     1 Shots of liquor per week      Comment: occasional    Drug use: No    Sexual activity: Yes     Other Topics Concern    None     Social History Narrative    None       Review of Systems:  Review of Systems    OBJECTIVE:     Vital Signs  Pulse: 75  BP: 116/68  Pain Score: 0-No pain  Height: 5' 8" (172.7 cm)  Weight: 61.2 kg (135 lb)  Body mass index is 20.53 kg/m².    Physical Exam:  Physical Exam:  Vitals reviewed.    Constitutional: He appears well-developed and well-nourished. No distress.     Eyes: Pupils are equal, round, and reactive to light. Conjunctivae and EOM are normal.     Cardiovascular: Normal rate, regular rhythm, normal pulses and no edema.     Abdominal: Soft. Bowel sounds are normal.     Skin: Skin displays no rash on trunk and no rash on extremities. Skin displays no lesions on trunk and no lesions on extremities.     Psych/Behavior: He is alert. He is oriented to person, place, and time. He has a normal mood and affect.     Musculoskeletal: Gait is normal.        Neck: Range of motion is full. There is no tenderness. Muscle strength is 5/5. Tone is normal.        Back: Range of motion is full. There is no tenderness. Muscle strength is 5/5. Tone is normal.        Right Upper Extremities: Range of motion is full. There is no tenderness. Muscle strength is 5/5. Tone is normal.        Left Upper Extremities: Range of motion is full. There is no tenderness. Muscle strength is 5/5. Tone is normal.       Right Lower Extremities: Range of motion is full. There is no tenderness. Muscle strength is 5/5. Tone is normal.        Left Lower Extremities: Range of motion is full. There is no tenderness. Muscle strength is 5/5. Tone is normal.     Neurological:        " Coordination: He has a normal Romberg Test, normal finger to nose coordination and normal tandem walking coordination.        Sensory: There is no sensory deficit in the trunk. There is no sensory deficit in the extremities.        DTRs: DTRs are DTRS NORMAL AND SYMMETRICnormal and symmetric. He displays no Babinski's sign on the right side. He displays no Babinski's sign on the left side.        Cranial nerves: Cranial nerve(s) II, III, IV, V, VI, VII, VIII, IX, X, XI and XII are intact.         Diagnostic Results:  He has no updated imaging studies available for review.    ASSESSMENT/PLAN:     Mr. Worley is a 22-year-old male status post right L5-S1 microdiscectomy.  He is doing great with complete resolution of his symptoms.  His wound is well-healed.  At this point, I have released the patient to return to full activity including weightlifting, golfing, and swimming.  There is no need for any further neurosurgical follow-up.  However, if he has any questions or concerns he knows he can call the office at any point.        Note dictated with voice recognition software, please excuse any grammatical errors.

## 2018-03-11 ENCOUNTER — PATIENT MESSAGE (OUTPATIENT)
Dept: NEUROSURGERY | Facility: CLINIC | Age: 23
End: 2018-03-11

## 2018-03-14 ENCOUNTER — PATIENT MESSAGE (OUTPATIENT)
Dept: NEUROSURGERY | Facility: CLINIC | Age: 23
End: 2018-03-14

## 2018-03-19 RX ORDER — CHLORZOXAZONE 500 MG/1
500 TABLET ORAL 4 TIMES DAILY PRN
Qty: 30 TABLET | Refills: 1 | Status: SHIPPED | OUTPATIENT
Start: 2018-03-19 | End: 2018-06-01 | Stop reason: ALTCHOICE

## 2018-05-04 ENCOUNTER — OFFICE VISIT (OUTPATIENT)
Dept: FAMILY MEDICINE | Facility: CLINIC | Age: 23
End: 2018-05-04
Payer: COMMERCIAL

## 2018-05-04 VITALS
HEIGHT: 68 IN | SYSTOLIC BLOOD PRESSURE: 123 MMHG | TEMPERATURE: 99 F | DIASTOLIC BLOOD PRESSURE: 60 MMHG | WEIGHT: 131.81 LBS | BODY MASS INDEX: 19.98 KG/M2 | RESPIRATION RATE: 20 BRPM

## 2018-05-04 DIAGNOSIS — L03.90 CELLULITIS, UNSPECIFIED CELLULITIS SITE: ICD-10-CM

## 2018-05-04 DIAGNOSIS — R05.9 COUGH: ICD-10-CM

## 2018-05-04 DIAGNOSIS — H60.90 OTITIS EXTERNA, UNSPECIFIED CHRONICITY, UNSPECIFIED LATERALITY, UNSPECIFIED TYPE: ICD-10-CM

## 2018-05-04 DIAGNOSIS — J02.9 SORE THROAT: Primary | ICD-10-CM

## 2018-05-04 PROCEDURE — 87081 CULTURE SCREEN ONLY: CPT

## 2018-05-04 PROCEDURE — 99214 OFFICE O/P EST MOD 30 MIN: CPT | Mod: S$GLB,,, | Performed by: FAMILY MEDICINE

## 2018-05-04 PROCEDURE — 99999 PR PBB SHADOW E&M-EST. PATIENT-LVL III: CPT | Mod: PBBFAC,,, | Performed by: FAMILY MEDICINE

## 2018-05-04 PROCEDURE — 3008F BODY MASS INDEX DOCD: CPT | Mod: CPTII,S$GLB,, | Performed by: FAMILY MEDICINE

## 2018-05-04 RX ORDER — CEPHALEXIN 500 MG/1
500 TABLET ORAL 4 TIMES DAILY
Qty: 40 TABLET | Refills: 0 | Status: SHIPPED | OUTPATIENT
Start: 2018-05-04 | End: 2018-05-14

## 2018-05-04 RX ORDER — NEOMYCIN SULFATE, POLYMYXIN B SULFATE AND HYDROCORTISONE 10; 3.5; 1 MG/ML; MG/ML; [USP'U]/ML
3 SUSPENSION/ DROPS AURICULAR (OTIC) 3 TIMES DAILY
Qty: 10 ML | Refills: 0 | Status: SHIPPED | OUTPATIENT
Start: 2018-05-04 | End: 2018-06-01 | Stop reason: ALTCHOICE

## 2018-05-04 RX ORDER — BENZONATATE 200 MG/1
200 CAPSULE ORAL 3 TIMES DAILY PRN
Qty: 30 CAPSULE | Refills: 0 | Status: SHIPPED | OUTPATIENT
Start: 2018-05-04 | End: 2018-05-14

## 2018-05-04 NOTE — PATIENT INSTRUCTIONS
External Ear Infection (Adult)    External otitis (also called swimmers ear) is an infection in the ear canal. It is often caused by bacteria or fungus. It can occur a few days after water gets trapped in the ear canal (from swimming or bathing). It can also occur after cleaning too deeply in the ear canal with a cotton swab or other object. Sometimes, hair care products get into the ear canal and cause this problem.  Symptoms can include pain, fever, itching, redness, drainage, or swelling of the ear canal. Temporary hearing loss may also occur.  Home care  · Do not try to clean the ear canal. This can push pus and bacteria deeper into the canal.  · Use prescribed ear drops as directed. These help reduce swelling and fight the infection. If an ear wick was placed in the ear canal, apply drops right onto the end of the wick. The wick will draw the medication into the ear canal even if it is swollen closed.  · A cotton ball may be loosely placed in the outer ear to absorb any drainage.  · You may use acetaminophen or ibuprofen to control pain, unless another medication was prescribed. Note: If you have chronic liver or kidney disease or ever had a stomach ulcer or GI bleeding, talk to your health care provider before taking any of these medications.  · Do not allow water to get into your ear when bathing. Also, avoid swimming until the infection has cleared.  Prevention  · Keep your ears dry. This helps lower the risk of infection. Dry your ears with a towel or hair dryer after getting wet. Also, use ear plugs when swimming.  · Do not stick any objects in the ear to remove wax.  · If you feel water trapped in your ear, use ear drops right away. You can get these drops over the counter at most drugstores. They work by removing water from the ear canal.  Follow-up care  Follow up with your health care provider in one week, or as advised.  When to seek medical advice  Call your health care provider right away if  any of these occur:  · Ear pain becomes worse or doesnt improve after 3 days of treatment  · Redness or swelling of the outer ear occurs or gets worse  · Headache  · Painful or stiff neck  · Drowsiness or confusion  · Fever of 100.4ºF (38ºC) or higher, or as directed by your health care provider  · Seizure  Date Last Reviewed: 3/22/2015  © 7876-4662 [employer]. [address] [place] [address], [place] [address]. All rights reserved. This information is not intended as a substitute for professional medical care. Always follow your healthcare professional's instructions.        When Your Child Has Pharyngitis or Tonsillitis    Your childs throat feels sore. This is likely because of redness and swelling (inflammation) of the throat. Two areas of the throat are most often affected: the pharynx and tonsils. Inflammation of the pharynx (pharyngitis) and inflammation of the tonsils (tonsillitis) are very common in children. This sheet tells you what you can do to relieve your childs throat pain.  What causes pharyngitis or tonsillitis?  Most commonly, pharyngitis and tonsillitis are caused by a viral or bacterial infection.  What are the symptoms of pharyngitis or tonsillitis?  The main symptom of both conditions is a sore throat. Your child may also have a fever, redness or swelling of the throat, and trouble swallowing. You may feel lumps in the neck.  How is pharyngitis or tonsillitis diagnosed?  The healthcare provider will examine your childs throat. The healthcare provider might wipe (swab) your childs throat. This swab will be tested for the bacteria that causes an infection called strep throat. If needed, a blood test can be done to check for a viral infection such as mononucleosis.  How is pharyngitis or tonsillitis treated?  If your childs sore throat is caused by a bacterial infection, the healthcare provider may prescribe antibiotics. Otherwise, you can treat your childs sore throat at home. To do  this:  · Give your child acetaminophen or ibuprofen to ease the pain. Don't use ibuprofen in children younger than 6 months of age or in children who are dehydrated or vomiting all of the time. Dont give your child aspirin to relieve a fever. Using aspirin to treat a fever in children could cause a serious condition called Reye syndrome.  · Give your child cool liquids to drink.  · Have your child gargle with warm saltwater if it helps relieve pain. An over-the-counter throat numbing spray may also help.  What are the long-term concerns?  If your child has frequent sore throats, take him or her to see a healthcare provider. Removing the tonsils may help relieve your childs recurring problems.  When to call your child's healthcare provider  Call your childs healthcare provider right away if your otherwise healthy child has any of the following:  · Fever (see Fever and children, below)  · Sore throat pain that persists for 2 to 3 days  · Sore throat with fever, headache, stomachache, or rash  · Trouble turning or straightening the head  · Problems swallowing or drooling  · Trouble breathing or needing to lean forward to breathe  · Problems opening mouth fully     Fever and children  Always use a digital thermometer to check your childs temperature. Never use a mercury thermometer.  For infants and toddlers, be sure to use a rectal thermometer correctly. A rectal thermometer may accidentally poke a hole in (perforate) the rectum. It may also pass on germs from the stool. Always follow the product makers directions for proper use. If you dont feel comfortable taking a rectal temperature, use another method. When you talk to your childs healthcare provider, tell him or her which method you used to take your childs temperature.  Here are guidelines for fever temperature. Ear temperatures arent accurate before 6 months of age. Dont take an oral temperature until your child is at least 4 years old.  Infant under 3  months old:  · Ask your childs healthcare provider how you should take the temperature.  · Rectal or forehead (temporal artery) temperature of 100.4°F (38°C) or higher, or as directed by the provider  · Armpit temperature of 99°F (37.2°C) or higher, or as directed by the provider  Child age 3 to 36 months:  · Rectal, forehead (temporal artery), or ear temperature of 102°F (38.9°C) or higher, or as directed by the provider  · Armpit temperature of 101°F (38.3°C) or higher, or as directed by the provider  Child of any age:  · Repeated temperature of 104°F (40°C) or higher, or as directed by the provider  · Fever that lasts more than 24 hours in a child under 2 years old. Or a fever that lasts for 3 days in a child 2 years or older.   Date Last Reviewed: 11/1/2016  © 9562-7680 [employer]. [address] [place] [address], [place] [address]. All rights reserved. This information is not intended as a substitute for professional medical care. Always follow your healthcare professional's instructions.

## 2018-05-05 NOTE — PROGRESS NOTES
Subjective:       Patient ID: Renzo Worley is a 23 y.o. male.    Chief Complaint: Sore Throat   right ear pain, low-grade fever decreased hearing, persistent cough  HPI see above  Review of Systems   Constitutional: Negative.    HENT: Positive for congestion, ear pain, rhinorrhea and sinus pain.    Eyes: Negative.    Respiratory: Positive for cough.    Cardiovascular: Negative.    Gastrointestinal: Negative.    Endocrine: Negative.    Genitourinary: Negative.    Musculoskeletal: Negative.    Allergic/Immunologic: Negative.    Neurological: Negative.    Hematological: Negative.    Psychiatric/Behavioral: Negative.        Objective:      Physical Exam   Constitutional: He is oriented to person, place, and time. He appears well-developed and well-nourished. No distress.   HENT:   Head: Normocephalic and atraumatic.   Right Ear: Hearing and tympanic membrane normal. There is drainage, swelling and tenderness.   Left Ear: Tympanic membrane, external ear and ear canal normal.   Nose: Rhinorrhea present.   Mouth/Throat: Posterior oropharyngeal erythema present.   Eyes: Conjunctivae and EOM are normal. Pupils are equal, round, and reactive to light.   Cardiovascular: Normal rate, regular rhythm and normal heart sounds.    Pulmonary/Chest: Breath sounds normal. He is in respiratory distress.   Abdominal: Bowel sounds are normal. There is tenderness.   Neurological: He is alert and oriented to person, place, and time.   Skin: He is not diaphoretic.   Nursing note and vitals reviewed.      Assessment:       1. Sore throat    2. Otitis externa, unspecified chronicity, unspecified laterality, unspecified type    3. Cellulitis, unspecified cellulitis site    4. Cough        Plan:        Strep A culture, throat     Contact the patient results of culture are available  Further med card dated May 4, 2018  neomycin-polymyxin-hydrocortisone (CORTISPORIN) 3.5-10,000-1 mg/mL-unit/mL-% otic suspension 3 drop, 3 times daily          hydrocodone-acetaminophen 5-325mg (NORCO) 5-325 mg per tablet 1 tablet, Every 6 hours PRN        gabapentin (NEURONTIN) 300 MG capsule         chlorzoxazone (PARAFON FORTE) 500 mg Tab 500 mg, 4 times daily PRN        cephalexin (KEFTAB) 500 mg tablet 500 mg, 4 times daily        benzonatate (TESSALON) 200 MG capsule 200 mg, 3 times daily PRN

## 2018-05-07 LAB — BACTERIA THROAT CULT: NORMAL

## 2018-05-08 ENCOUNTER — PATIENT MESSAGE (OUTPATIENT)
Dept: FAMILY MEDICINE | Facility: CLINIC | Age: 23
End: 2018-05-08

## 2018-06-01 ENCOUNTER — OFFICE VISIT (OUTPATIENT)
Dept: FAMILY MEDICINE | Facility: CLINIC | Age: 23
End: 2018-06-01
Payer: COMMERCIAL

## 2018-06-01 VITALS
RESPIRATION RATE: 16 BRPM | HEIGHT: 68 IN | WEIGHT: 131.81 LBS | SYSTOLIC BLOOD PRESSURE: 126 MMHG | BODY MASS INDEX: 19.98 KG/M2 | TEMPERATURE: 99 F | DIASTOLIC BLOOD PRESSURE: 59 MMHG

## 2018-06-01 DIAGNOSIS — H60.90 OTITIS EXTERNA, UNSPECIFIED CHRONICITY, UNSPECIFIED LATERALITY, UNSPECIFIED TYPE: Primary | ICD-10-CM

## 2018-06-01 PROCEDURE — 99999 PR PBB SHADOW E&M-EST. PATIENT-LVL III: CPT | Mod: PBBFAC,,, | Performed by: FAMILY MEDICINE

## 2018-06-01 PROCEDURE — 99213 OFFICE O/P EST LOW 20 MIN: CPT | Mod: 25,S$GLB,, | Performed by: FAMILY MEDICINE

## 2018-06-01 PROCEDURE — G0268 REMOVAL OF IMPACTED WAX MD: HCPCS | Mod: S$GLB,,, | Performed by: FAMILY MEDICINE

## 2018-06-01 PROCEDURE — 3008F BODY MASS INDEX DOCD: CPT | Mod: CPTII,S$GLB,, | Performed by: FAMILY MEDICINE

## 2018-06-01 RX ORDER — NEOMYCIN SULFATE, POLYMYXIN B SULFATE AND HYDROCORTISONE 10; 3.5; 1 MG/ML; MG/ML; [USP'U]/ML
3 SUSPENSION/ DROPS AURICULAR (OTIC) 3 TIMES DAILY
Qty: 10 ML | Refills: 1 | Status: SHIPPED | OUTPATIENT
Start: 2018-06-01 | End: 2019-02-01

## 2018-06-01 NOTE — PATIENT INSTRUCTIONS

## 2018-06-03 NOTE — PROGRESS NOTES
Subjective:       Patient ID: Renzo Worley is a 23 y.o. male.    Chief Complaint: Otalgia (left)   decreased hearing bilaterally over a week,  no fever , nasal congestion cough or chills,  No sore throat, no sinus pain.  Otalgia    There is pain in the left ear. This is a recurrent problem. The current episode started in the past 7 days. The problem occurs constantly. The problem has been unchanged. The pain is at a severity of 6/10. The pain is moderate. Associated symptoms include hearing loss. Pertinent negatives include no abdominal pain, coughing, diarrhea, drainage, ear discharge, headaches, neck pain, rash, rhinorrhea, sore throat or vomiting. He has tried nothing for the symptoms. There is no history of a chronic ear infection, hearing loss or a tympanostomy tube.   see above  Review of Systems   HENT: Positive for ear pain and hearing loss. Negative for ear discharge, rhinorrhea and sore throat.    Respiratory: Negative for cough.    Gastrointestinal: Negative for abdominal pain, diarrhea and vomiting.   Musculoskeletal: Negative for neck pain.   Skin: Negative for rash.   Neurological: Negative for headaches.       Objective:      Physical Exam   Constitutional: He is oriented to person, place, and time. He appears well-developed and well-nourished.   HENT:   Head: Normocephalic and atraumatic.   Right Ear: There is swelling and tenderness. A foreign body is present. Decreased hearing is noted.   Left Ear: There is swelling and tenderness. A foreign body is present. Decreased hearing is noted.   Eyes: Conjunctivae, EOM and lids are normal. Pupils are equal, round, and reactive to light. Right conjunctiva is not injected. Right conjunctiva has no hemorrhage. Left conjunctiva is not injected. Left conjunctiva has no hemorrhage.   Neck: Normal range of motion and full passive range of motion without pain. Neck supple. No thyromegaly present.   Cardiovascular: Normal rate, regular rhythm, normal heart sounds  and intact distal pulses.    Pulmonary/Chest: Effort normal and breath sounds normal. No respiratory distress.   Abdominal: Soft. Bowel sounds are normal. He exhibits no distension. There is no tenderness. There is no guarding.   Neurological: He is alert and oriented to person, place, and time. He displays normal reflexes. No cranial nerve deficit or sensory deficit. He exhibits normal muscle tone. Coordination normal.   Skin: Skin is warm and dry. No rash noted. No erythema. No pallor.   Psychiatric: He has a normal mood and affect. His behavior is normal. Judgment and thought content normal.   Nursing note and vitals reviewed.      Assessment:       1. Otitis externa, unspecified chronicity, unspecified laterality, unspecified type     2.     Ceruminosis bilaterally  Plan:     pt agreed to bilateral ear wash which was done without   Difficulty.  Cerumen was cleared from both ear canals easily.  Patient had no side effects.  Ear canals are red and swollen bilaterally    med card dated 6/1/2018  (CORTISPORIN) 3.5-10,000-1 mg/mL-unit/mL-% otic suspension 10 mL 1 6/1/2018  --   Sig - Route: Place 3 drops into both ears 3 (three) times daily. - Both Ears   Class: Normal      f/u if not better.

## 2019-02-01 ENCOUNTER — OFFICE VISIT (OUTPATIENT)
Dept: FAMILY MEDICINE | Facility: CLINIC | Age: 24
End: 2019-02-01
Payer: COMMERCIAL

## 2019-02-01 VITALS
DIASTOLIC BLOOD PRESSURE: 60 MMHG | TEMPERATURE: 98 F | BODY MASS INDEX: 20.87 KG/M2 | SYSTOLIC BLOOD PRESSURE: 134 MMHG | HEART RATE: 65 BPM | WEIGHT: 132.94 LBS | HEIGHT: 67 IN

## 2019-02-01 DIAGNOSIS — B35.4 TINEA CORPORIS: Primary | ICD-10-CM

## 2019-02-01 DIAGNOSIS — L03.119 CELLULITIS OF LOWER EXTREMITY, UNSPECIFIED LATERALITY: ICD-10-CM

## 2019-02-01 PROCEDURE — 99214 OFFICE O/P EST MOD 30 MIN: CPT | Mod: S$GLB,,, | Performed by: INTERNAL MEDICINE

## 2019-02-01 PROCEDURE — 3008F PR BODY MASS INDEX (BMI) DOCUMENTED: ICD-10-PCS | Mod: CPTII,S$GLB,, | Performed by: INTERNAL MEDICINE

## 2019-02-01 PROCEDURE — 99999 PR PBB SHADOW E&M-EST. PATIENT-LVL III: ICD-10-PCS | Mod: PBBFAC,,, | Performed by: INTERNAL MEDICINE

## 2019-02-01 PROCEDURE — 99999 PR PBB SHADOW E&M-EST. PATIENT-LVL III: CPT | Mod: PBBFAC,,, | Performed by: INTERNAL MEDICINE

## 2019-02-01 PROCEDURE — 3008F BODY MASS INDEX DOCD: CPT | Mod: CPTII,S$GLB,, | Performed by: INTERNAL MEDICINE

## 2019-02-01 PROCEDURE — 99214 PR OFFICE/OUTPT VISIT, EST, LEVL IV, 30-39 MIN: ICD-10-PCS | Mod: S$GLB,,, | Performed by: INTERNAL MEDICINE

## 2019-02-01 RX ORDER — CLINDAMYCIN HYDROCHLORIDE 300 MG/1
300 CAPSULE ORAL EVERY 6 HOURS
Qty: 40 CAPSULE | Refills: 0 | Status: SHIPPED | OUTPATIENT
Start: 2019-02-01 | End: 2019-02-11

## 2019-02-01 RX ORDER — CLOTRIMAZOLE 1 %
CREAM (GRAM) TOPICAL 2 TIMES DAILY
Qty: 45 G | Refills: 1 | Status: SHIPPED | OUTPATIENT
Start: 2019-02-01 | End: 2021-02-08

## 2019-02-01 NOTE — PROGRESS NOTES
Subjective:        Patient ID: Renzo Worley is a 23 y.o. male.    Chief Complaint: Tinea (left leg)    HPI   Renzo Worley presents with rashes on b/l legs.  The rash started on the L leg 2 weeks ago.  They were red, itchy and circular when they first started.  Pt now has the rash on his right leg also and he has been scratching.  He applied an abx ointment and OTC anti-itch cream x 1 day.  He has not taken anything orally to treat his sx.  Pt is accompanied by his father.    Pt plays golf, has a cat but no new pets or other environmental exposures.    Review of Systems  as per HPI      Objective:        Vitals:    02/01/19 1428   BP: 134/60   Pulse: 65   Temp: 98.3 °F (36.8 °C)     Physical Exam   Constitutional: He is oriented to person, place, and time. He appears well-developed and well-nourished. No distress.   Neurological: He is alert and oriented to person, place, and time.   Skin:   L leg: large area of erythema, diffuse excoriations with ?2 quarter-sized annular lesions  R leg: small area of erythema and excoriations   Vitals reviewed.          Assessment:         1. Tinea corporis    2. Cellulitis of lower extremity, unspecified laterality              Plan:         Renzo was seen today for tinea.    Diagnoses and all orders for this visit:    Tinea corporis  -     clotrimazole (LOTRIMIN) 1 % cream; Apply topically 2 (two) times daily.    Cellulitis of lower extremity, unspecified laterality  -     clindamycin (CLEOCIN) 300 MG capsule; Take 1 capsule (300 mg total) by mouth every 6 (six) hours. for 10 days    - very difficult to see the original skin lesions due to the excoriations though the annular lesions on the L leg may have been tinea  - start clindamycin for cellulitis  - topical clotrimazole BID for possible underlying tinea  - otc benadryl as needed for itching          Follow-up in about 2 weeks (around 2/15/2019), or if symptoms worsen or fail to improve.    Patient Instructions   Keep skin  clean and dry.    Apply antifungal cream (Clotrimazole) twice daily until rash resolves.    Take Clindamycin for skin infection.    Take over the counter Benadryl as needed for itching.

## 2019-02-01 NOTE — PATIENT INSTRUCTIONS
Keep skin clean and dry.    Apply antifungal cream (Clotrimazole) twice daily until rash resolves.    Take Clindamycin for skin infection.    Take over the counter Benadryl as needed for itching.

## 2020-08-14 ENCOUNTER — HOSPITAL ENCOUNTER (OUTPATIENT)
Dept: RADIOLOGY | Facility: HOSPITAL | Age: 25
Discharge: HOME | End: 2020-08-14
Attending: ORTHOPAEDIC SURGERY
Payer: MEDICAID

## 2020-08-14 ENCOUNTER — TELEPHONE (OUTPATIENT)
Dept: ORTHOPEDICS | Facility: CLINIC | Age: 25
End: 2020-08-14

## 2020-08-14 ENCOUNTER — OFFICE VISIT (OUTPATIENT)
Dept: ORTHOPEDICS | Facility: CLINIC | Age: 25
End: 2020-08-14
Payer: MEDICAID

## 2020-08-14 VITALS — TEMPERATURE: 98 F

## 2020-08-14 DIAGNOSIS — R22.31 MASS OF FINGER OF RIGHT HAND: ICD-10-CM

## 2020-08-14 DIAGNOSIS — R22.31 MASS OF FINGER OF RIGHT HAND: Primary | ICD-10-CM

## 2020-08-14 DIAGNOSIS — Z41.9 ELECTIVE SURGERY: ICD-10-CM

## 2020-08-14 PROCEDURE — 99999 PR PBB SHADOW E&M-EST. PATIENT-LVL IV: ICD-10-PCS | Mod: PBBFAC,,, | Performed by: ORTHOPAEDIC SURGERY

## 2020-08-14 PROCEDURE — 73140 XR FINGER 2 OR MORE VIEWS: ICD-10-PCS | Mod: 26,RT,, | Performed by: RADIOLOGY

## 2020-08-14 PROCEDURE — 73140 X-RAY EXAM OF FINGER(S): CPT | Mod: TC,PN,RT

## 2020-08-14 PROCEDURE — 99214 OFFICE O/P EST MOD 30 MIN: CPT | Mod: PBBFAC,25,PN | Performed by: ORTHOPAEDIC SURGERY

## 2020-08-14 PROCEDURE — 73140 X-RAY EXAM OF FINGER(S): CPT | Mod: 26,RT,, | Performed by: RADIOLOGY

## 2020-08-14 PROCEDURE — 99204 PR OFFICE/OUTPT VISIT, NEW, LEVL IV, 45-59 MIN: ICD-10-PCS | Mod: S$PBB,,, | Performed by: ORTHOPAEDIC SURGERY

## 2020-08-14 PROCEDURE — 99204 OFFICE O/P NEW MOD 45 MIN: CPT | Mod: S$PBB,,, | Performed by: ORTHOPAEDIC SURGERY

## 2020-08-14 PROCEDURE — 99999 PR PBB SHADOW E&M-EST. PATIENT-LVL IV: CPT | Mod: PBBFAC,,, | Performed by: ORTHOPAEDIC SURGERY

## 2020-08-14 RX ORDER — MUPIROCIN 20 MG/G
OINTMENT TOPICAL
Status: CANCELLED | OUTPATIENT
Start: 2020-08-14

## 2020-08-14 RX ORDER — SODIUM CHLORIDE 9 MG/ML
INJECTION, SOLUTION INTRAVENOUS CONTINUOUS
Status: CANCELLED | OUTPATIENT
Start: 2020-08-14

## 2020-08-14 NOTE — PROGRESS NOTES
Subjective:      Patient ID: Renzo Worley is a 25 y.o. male.    Chief Complaint: Mass (right index finger)      HPI: Renzo Worley a new patient.  He is here with complaints of mass on the right index finger for 3-4 years.  He denies any relevant history of injury or puncture wound.  The mass is not painful.  It has been gradually enlarging.  He denies any numbness or tingling.     Past Medical History:   Diagnosis Date    ADHD     Anxiety     Concussion without loss of consciousness 2009    Right sided sciatica 8/2/2017       Current Outpatient Medications:     clotrimazole (LOTRIMIN) 1 % cream, Apply topically 2 (two) times daily. (Patient not taking: Reported on 8/14/2020), Disp: 45 g, Rfl: 1  Review of patient's allergies indicates:   Allergen Reactions    No known drug allergies        Temp 97.8 °F (36.6 °C)     Review of Systems   Constitution: Negative for chills and fever.   Cardiovascular: Negative for chest pain and palpitations.   Respiratory: Negative for shortness of breath and wheezing.    Skin: Negative for poor wound healing and rash.   Musculoskeletal: Positive for joint swelling. Negative for stiffness.   Gastrointestinal: Negative for nausea and vomiting.   Genitourinary: Negative for dysuria and hematuria.   Neurological: Negative for numbness, paresthesias, seizures and tremors.   Psychiatric/Behavioral: Negative for altered mental status.   Allergic/Immunologic: Negative for environmental allergies and persistent infections.         Objective:    Ortho Exam       Right upper extremity:  Significant for 0.5 x 1 cm soft mobile mass on the palmar side of the index finger in between the DIP and PIP joints.  Mass seems to be slightly lobulated.  Range of motion fingers full.  There is no tenderness to palpation.  Sensation intact.  Pulses present.  Cap refill brisk  GEN: Well developed, well nourished male. AAOX3. No acute distress.   Normocephalic, atraumatic.   JORGE  Breathing  unlabored.  Mood and affect appropriate.     Assessment:     Imaging:  Finger radiographs from today without foreign body.  No fracture dislocation.        1. Mass of finger of right hand          Plan:       Explained my clinical impression to the patient to include giant cell tumor vs inclusion cyst.  I recommended surgical excision and pathology.  Patient is agreeable.  Pre, dionna, and post operative procedures and expectations discussed. All questions were answered. Consent forms were explained and signed by the patient.   Renzo Worley will contact us if there are any questions, concerns, or changes in medical status prior to surgery.    Orders Placed This Encounter    X-Ray Finger 2 or More Views    Case Request Operating Room: EXCISION, MASS, HAND     Follow up for Postop care.

## 2020-08-20 ENCOUNTER — ANESTHESIA EVENT (OUTPATIENT)
Dept: SURGERY | Facility: HOSPITAL | Age: 25
End: 2020-08-20
Payer: MEDICAID

## 2020-09-02 ENCOUNTER — LAB VISIT (OUTPATIENT)
Dept: URGENT CARE | Facility: CLINIC | Age: 25
End: 2020-09-02
Payer: MEDICAID

## 2020-09-02 VITALS — HEART RATE: 90 BPM | TEMPERATURE: 99 F | OXYGEN SATURATION: 99 % | RESPIRATION RATE: 16 BRPM

## 2020-09-02 DIAGNOSIS — Z41.9 ELECTIVE SURGERY: ICD-10-CM

## 2020-09-02 PROCEDURE — U0003 INFECTIOUS AGENT DETECTION BY NUCLEIC ACID (DNA OR RNA); SEVERE ACUTE RESPIRATORY SYNDROME CORONAVIRUS 2 (SARS-COV-2) (CORONAVIRUS DISEASE [COVID-19]), AMPLIFIED PROBE TECHNIQUE, MAKING USE OF HIGH THROUGHPUT TECHNOLOGIES AS DESCRIBED BY CMS-2020-01-R: HCPCS

## 2020-09-03 LAB — SARS-COV-2 RNA RESP QL NAA+PROBE: NOT DETECTED

## 2020-09-04 ENCOUNTER — ANESTHESIA (OUTPATIENT)
Dept: SURGERY | Facility: HOSPITAL | Age: 25
End: 2020-09-04
Payer: MEDICAID

## 2020-09-04 ENCOUNTER — HOSPITAL ENCOUNTER (OUTPATIENT)
Facility: HOSPITAL | Age: 25
Discharge: HOME | End: 2020-09-04
Attending: ORTHOPAEDIC SURGERY | Admitting: ORTHOPAEDIC SURGERY
Payer: MEDICAID

## 2020-09-04 VITALS
RESPIRATION RATE: 16 BRPM | TEMPERATURE: 98 F | OXYGEN SATURATION: 100 % | SYSTOLIC BLOOD PRESSURE: 99 MMHG | BODY MASS INDEX: 21.97 KG/M2 | WEIGHT: 140 LBS | DIASTOLIC BLOOD PRESSURE: 55 MMHG | HEIGHT: 67 IN | HEART RATE: 61 BPM

## 2020-09-04 DIAGNOSIS — R22.31 MASS OF FINGER OF RIGHT HAND: ICD-10-CM

## 2020-09-04 PROCEDURE — 63600175 PHARM REV CODE 636 W HCPCS: Performed by: STUDENT IN AN ORGANIZED HEALTH CARE EDUCATION/TRAINING PROGRAM

## 2020-09-04 PROCEDURE — 71000015 HC POSTOP RECOV 1ST HR: Performed by: ORTHOPAEDIC SURGERY

## 2020-09-04 PROCEDURE — 26111 EXC HAND LES SC 1.5 CM/>: CPT | Mod: F6,,, | Performed by: ORTHOPAEDIC SURGERY

## 2020-09-04 PROCEDURE — 01810 ANES PX NRV MUSC F/ARM WRST: CPT | Performed by: ORTHOPAEDIC SURGERY

## 2020-09-04 PROCEDURE — S0020 INJECTION, BUPIVICAINE HYDRO: HCPCS | Performed by: ORTHOPAEDIC SURGERY

## 2020-09-04 PROCEDURE — 37000009 HC ANESTHESIA EA ADD 15 MINS: Performed by: ORTHOPAEDIC SURGERY

## 2020-09-04 PROCEDURE — 37000008 HC ANESTHESIA 1ST 15 MINUTES: Performed by: ORTHOPAEDIC SURGERY

## 2020-09-04 PROCEDURE — 36000707: Performed by: ORTHOPAEDIC SURGERY

## 2020-09-04 PROCEDURE — 63600175 PHARM REV CODE 636 W HCPCS: Performed by: ORTHOPAEDIC SURGERY

## 2020-09-04 PROCEDURE — 88307 PR  SURG PATH,LEVEL V: ICD-10-PCS | Mod: 26,,, | Performed by: PATHOLOGY

## 2020-09-04 PROCEDURE — 88307 TISSUE EXAM BY PATHOLOGIST: CPT | Performed by: PATHOLOGY

## 2020-09-04 PROCEDURE — 88307 TISSUE EXAM BY PATHOLOGIST: CPT | Mod: 26,,, | Performed by: PATHOLOGY

## 2020-09-04 PROCEDURE — 71000016 HC POSTOP RECOV ADDL HR: Performed by: ORTHOPAEDIC SURGERY

## 2020-09-04 PROCEDURE — 26111 PR EX TUM/VASC MALF SFT TISS HAND/FNGR SUBQ 1.5+CM: ICD-10-PCS | Mod: F6,,, | Performed by: ORTHOPAEDIC SURGERY

## 2020-09-04 PROCEDURE — 25000003 PHARM REV CODE 250: Performed by: ORTHOPAEDIC SURGERY

## 2020-09-04 PROCEDURE — 36000706: Performed by: ORTHOPAEDIC SURGERY

## 2020-09-04 RX ORDER — PROPOFOL 10 MG/ML
INJECTION, EMULSION INTRAVENOUS
Status: DISCONTINUED | OUTPATIENT
Start: 2020-09-04 | End: 2020-09-04

## 2020-09-04 RX ORDER — SODIUM CHLORIDE, SODIUM LACTATE, POTASSIUM CHLORIDE, CALCIUM CHLORIDE 600; 310; 30; 20 MG/100ML; MG/100ML; MG/100ML; MG/100ML
INJECTION, SOLUTION INTRAVENOUS CONTINUOUS PRN
Status: DISCONTINUED | OUTPATIENT
Start: 2020-09-04 | End: 2020-09-04

## 2020-09-04 RX ORDER — SODIUM CHLORIDE 0.9 % (FLUSH) 0.9 %
10 SYRINGE (ML) INJECTION
Status: DISCONTINUED | OUTPATIENT
Start: 2020-09-04 | End: 2020-09-04 | Stop reason: HOSPADM

## 2020-09-04 RX ORDER — HYDROCODONE BITARTRATE AND ACETAMINOPHEN 5; 325 MG/1; MG/1
1 TABLET ORAL EVERY 4 HOURS PRN
Qty: 20 TABLET | Refills: 0 | Status: SHIPPED | OUTPATIENT
Start: 2020-09-04 | End: 2021-02-08

## 2020-09-04 RX ORDER — SODIUM CHLORIDE 9 MG/ML
INJECTION, SOLUTION INTRAVENOUS CONTINUOUS
Status: DISCONTINUED | OUTPATIENT
Start: 2020-09-04 | End: 2020-09-04 | Stop reason: HOSPADM

## 2020-09-04 RX ORDER — BUPIVACAINE HYDROCHLORIDE 5 MG/ML
INJECTION, SOLUTION EPIDURAL; INTRACAUDAL
Status: DISCONTINUED | OUTPATIENT
Start: 2020-09-04 | End: 2020-09-04 | Stop reason: HOSPADM

## 2020-09-04 RX ORDER — MIDAZOLAM HYDROCHLORIDE 1 MG/ML
INJECTION, SOLUTION INTRAMUSCULAR; INTRAVENOUS
Status: DISCONTINUED | OUTPATIENT
Start: 2020-09-04 | End: 2020-09-04

## 2020-09-04 RX ORDER — HYDROCODONE BITARTRATE AND ACETAMINOPHEN 5; 325 MG/1; MG/1
1 TABLET ORAL EVERY 4 HOURS PRN
Status: DISCONTINUED | OUTPATIENT
Start: 2020-09-04 | End: 2020-09-04 | Stop reason: HOSPADM

## 2020-09-04 RX ORDER — PHENYLEPHRINE HYDROCHLORIDE 10 MG/ML
INJECTION INTRAVENOUS
Status: DISCONTINUED | OUTPATIENT
Start: 2020-09-04 | End: 2020-09-04

## 2020-09-04 RX ORDER — PROPOFOL 10 MG/ML
INJECTION, EMULSION INTRAVENOUS CONTINUOUS PRN
Status: DISCONTINUED | OUTPATIENT
Start: 2020-09-04 | End: 2020-09-04

## 2020-09-04 RX ORDER — FENTANYL CITRATE 50 UG/ML
INJECTION, SOLUTION INTRAMUSCULAR; INTRAVENOUS
Status: DISCONTINUED | OUTPATIENT
Start: 2020-09-04 | End: 2020-09-04

## 2020-09-04 RX ORDER — OXYCODONE HYDROCHLORIDE 5 MG/1
10 TABLET ORAL EVERY 4 HOURS PRN
Status: DISCONTINUED | OUTPATIENT
Start: 2020-09-04 | End: 2020-09-04 | Stop reason: HOSPADM

## 2020-09-04 RX ORDER — CEFAZOLIN SODIUM 2 G/50ML
2 SOLUTION INTRAVENOUS
Status: COMPLETED | OUTPATIENT
Start: 2020-09-04 | End: 2020-09-04

## 2020-09-04 RX ORDER — LIDOCAINE HYDROCHLORIDE 10 MG/ML
INJECTION, SOLUTION EPIDURAL; INFILTRATION; INTRACAUDAL; PERINEURAL
Status: DISCONTINUED | OUTPATIENT
Start: 2020-09-04 | End: 2020-09-04 | Stop reason: HOSPADM

## 2020-09-04 RX ORDER — ONDANSETRON 8 MG/1
8 TABLET, ORALLY DISINTEGRATING ORAL EVERY 8 HOURS PRN
Status: DISCONTINUED | OUTPATIENT
Start: 2020-09-04 | End: 2020-09-04 | Stop reason: HOSPADM

## 2020-09-04 RX ORDER — ACETAMINOPHEN 325 MG/1
650 TABLET ORAL EVERY 4 HOURS PRN
Status: DISCONTINUED | OUTPATIENT
Start: 2020-09-04 | End: 2020-09-04 | Stop reason: HOSPADM

## 2020-09-04 RX ADMIN — FENTANYL CITRATE 50 MCG: 50 INJECTION, SOLUTION INTRAMUSCULAR; INTRAVENOUS at 10:09

## 2020-09-04 RX ADMIN — PHENYLEPHRINE HYDROCHLORIDE 50 MCG: 10 INJECTION INTRAVENOUS at 10:09

## 2020-09-04 RX ADMIN — SODIUM CHLORIDE, SODIUM LACTATE, POTASSIUM CHLORIDE, AND CALCIUM CHLORIDE: .6; .31; .03; .02 INJECTION, SOLUTION INTRAVENOUS at 09:09

## 2020-09-04 RX ADMIN — PROPOFOL 150 MCG/KG/MIN: 10 INJECTION, EMULSION INTRAVENOUS at 09:09

## 2020-09-04 RX ADMIN — MIDAZOLAM 2 MG: 1 INJECTION INTRAMUSCULAR; INTRAVENOUS at 09:09

## 2020-09-04 RX ADMIN — PROPOFOL 30 MG: 10 INJECTION, EMULSION INTRAVENOUS at 09:09

## 2020-09-04 RX ADMIN — FENTANYL CITRATE 25 MCG: 50 INJECTION, SOLUTION INTRAMUSCULAR; INTRAVENOUS at 10:09

## 2020-09-04 RX ADMIN — FENTANYL CITRATE 25 MCG: 50 INJECTION, SOLUTION INTRAMUSCULAR; INTRAVENOUS at 09:09

## 2020-09-04 RX ADMIN — CEFAZOLIN SODIUM 2 G: 2 SOLUTION INTRAVENOUS at 09:09

## 2020-09-04 NOTE — OP NOTE
Certification of Assistant at Surgery       Surgery Date: 9/4/2020     Participating Surgeons:  Surgeon(s) and Role:     * Case Luna Jr., MD - Primary    Procedures:  Procedure(s) (LRB):  EXCISION, MASS, HAND (Right)    Assistant Surgeon's Certification of Necessity:  I understand that section 1842 (b) (6) (d) of the Social Security Act generally prohibits Medicare Part B reasonable charge payment for the services of assistants at surgery in teaching hospitals when qualified residents are available to furnish such services. I certify that the services for which payment is claimed were medically necessary, and that no qualified resident was available to perform the services. I further understand that these services are subject to post-payment review by the Medicare carrier.      Hedy Jaquez PA-C    09/04/2020  10:21 AM

## 2020-09-04 NOTE — OP NOTE
Operative Note       Surgery Date: 9/4/2020     Surgeon(s) and Role:     * Case Luna Jr., MD - Primary    Pre-op Diagnosis:  Mass of finger of right hand [R22.31]    Post-op Diagnosis: Post-Op Diagnosis Codes:     * Mass of finger of right hand [R22.31]    Procedure(s) (LRB):  EXCISION, MASS, HAND (Right)    Anesthesia: Local MAC    Procedure in Detail/Findings:  Preop diagnosis:  Soft tissue mass right index finger    Postop diagnosis:  Same.    Operative procedure:  Excision biopsy soft tissue mass right index finger 1.5 X 1.5 cm incised    Surgeon:  Jeremy.    First assistant:  Gisele.    Anesthesia:  Mac.    EBL:  Minimal.    Specimen:  Soft tissue mass.    Complications:  None.    Operative procedure in detail as follows:    After operative consent was obtained patient brought to the operative room placed supine operating table.  Anesthesia by obvious sedation flow injection of xylocaine Marcaine combination of the base of the right index finger.  Following this a tourniquet applied to the right arm in the right upper extremity prepped and draped out in the normal sterile fashion.  The Esmarch used to exsanguinate and tourniquet inflated 225 mm of mercury.    A volar zigzag incision made with a 15 blade directly over the mass.  Careful dissection used to expose the mastoid raise full-thickness skin flaps.  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  It was carefully excised from surrounding tissue including the tendon.  The neurovascular structure was compressed but was not violated.  The mass was removed and sent for pathologic exam.  There was no evidence of infection.  Wound irrigated hemostasis to with the Bovie and the skin closed with interrupted running 5 0 nylon horizontal mattress suture.  Sterile dressing applied pill by light wrap tourniquet deflated patient brought to recovery room stable condition all sponge needle counts reported as correct no  comp        Estimated Blood Loss: * No values recorded between 9/4/2020 10:00 AM and 9/4/2020 10:22 AM *           Specimens (From admission, onward)     Start     Ordered    09/04/20 1006  Specimen to Pathology, Surgery Orthopedics  Once     Question:  Procedure Type:  Answer:  Orthopedics    09/04/20 1006              Implants: * No implants in log *           Disposition: PACU - hemodynamically stable.           Condition: Good    Attestation:  I was present and scrubbed for the entire procedure.           Discharge Note    Admit Date: 9/4/2020    Attending Physician: Case Luna Jr., MD     Discharge Physician: Case Luna Jr., MD    Final Diagnosis: Post-Op Diagnosis Codes:     * Mass of finger of right hand [R22.31]    Disposition: Home or Self Care    Patient Instructions:   Current Discharge Medication List      START taking these medications    Details   HYDROcodone-acetaminophen (NORCO) 5-325 mg per tablet Take 1 tablet by mouth every 4 (four) hours as needed for Pain.  Qty: 20 tablet, Refills: 0    Comments: Quantity prescribed more than 7 day supply? No         CONTINUE these medications which have NOT CHANGED    Details   clotrimazole (LOTRIMIN) 1 % cream Apply topically 2 (two) times daily.  Qty: 45 g, Refills: 1    Associated Diagnoses: Tinea corporis             Discharge Procedure Orders (must include Diet, Follow-up, Activity)   Discharge Procedure Orders (must include Diet, Follow-up, Activity)   Diet general     Call MD for:  temperature >100.4     Call MD for:  persistent nausea and vomiting     Call MD for:  severe uncontrolled pain     Keep surgical extremity elevated     Remove dressing in 48 hours     Shower on day dressing removed (No bath)        Discharge Date: No discharge date for patient encounter.

## 2020-09-04 NOTE — INTERVAL H&P NOTE
The patient has been examined and the H&P has been reviewed:    I concur with the findings and no changes have occurred since H&P was written.    Surgery risks, benefits and alternative options discussed and understood by patient/family.          Active Hospital Problems    Diagnosis  POA    Mass of finger of right hand [R22.31]  Yes      Resolved Hospital Problems   No resolved problems to display.

## 2020-09-04 NOTE — ANESTHESIA POSTPROCEDURE EVALUATION
Anesthesia Post Evaluation    Patient: Renzo Worley    Procedure(s) Performed: Procedure(s) (LRB):  EXCISION, MASS, HAND (Right)    Final Anesthesia Type: MAC    Patient location during evaluation: OPS  Patient participation: Yes- Able to Participate  Level of consciousness: awake and alert  Post-procedure vital signs: reviewed and stable  Pain management: adequate  Airway patency: patent    PONV status at discharge: No PONV  Anesthetic complications: no      Cardiovascular status: blood pressure returned to baseline  Respiratory status: unassisted  Hydration status: euvolemic            Vitals Value Taken Time   /46 09/04/20 1025   Temp 36.8 °C (98.2 °F) 09/04/20 1025   Pulse 64 09/04/20 1025   Resp 16 09/04/20 1025   SpO2 99 % 09/04/20 1025         No case tracking events are documented in the log.      Pain/Amy Score: No data recorded       Rx sent to [facility] location.     Talib An RN, BSN

## 2020-09-04 NOTE — TRANSFER OF CARE
-- Bev Villa (Self) [phone] ()          Alternative phone number: none    Turnaround time given to caller:   \"This message will be sent to [state Provider's name]. The clinical team will fulfill your request as soon as they review your message.\"     Anesthesia Transfer of Care Note    Patient: Renzo Worley    Procedure(s) Performed: Procedure(s) (LRB):  EXCISION, MASS, HAND (Right)    Patient location: OPS    Anesthesia Type: regional and MAC    Transport from OR: Transported from OR on 6-10 L/min O2 by face mask with adequate spontaneous ventilation    Post pain: adequate analgesia    Post assessment: no apparent anesthetic complications    Post vital signs: stable    Level of consciousness: awake and alert    Nausea/Vomiting: no nausea/vomiting    Complications: none    Transfer of care protocol was followed      Last vitals:   BP: 105/54  Pulse: 60  RR: 16  Temp: 36.5  SpO2: 100%

## 2020-09-04 NOTE — ANESTHESIA PREPROCEDURE EVALUATION
09/04/2020  Renzo Worley is a 25 y.o., male. With no significant pmhx presenting for excision of mass on right hand under MAC    Anesthesia Evaluation    I have reviewed the Patient Summary Reports.      I have reviewed the Medications.     Review of Systems  Anesthesia Hx:  No problems with previous Anesthesia  Neg history of prior surgery. Denies Family Hx of Anesthesia complications.   Denies Personal Hx of Anesthesia complications.   Social:  Non-Smoker, No Alcohol Use    Hematology/Oncology:  Hematology Normal   Oncology Normal     EENT/Dental:EENT/Dental Normal   Cardiovascular:  Cardiovascular Normal     Pulmonary:  Pulmonary Normal    Renal/:  Renal/ Normal     Hepatic/GI:  Hepatic/GI Normal    Musculoskeletal:  Musculoskeletal Normal Low back pain    Neurological:  Neurology Normal    Endocrine:  Endocrine Normal    Dermatological:  Skin Normal    Psych:   anxiety ADHD         Physical Exam  General:  Well nourished    Airway/Jaw/Neck:  Airway Findings: Mouth Opening: Normal Tongue: Normal  General Airway Assessment: Adult  Mallampati: II  TM Distance: Normal, at least 6 cm  Jaw/Neck Findings:     Neck ROM: Normal ROM     Eyes/Ears/Nose:  EYES/EARS/NOSE FINDINGS: Normal   Dental:  Dental Findings: In tact   Chest/Lungs:  Chest/Lungs Findings: Clear to auscultation     Heart/Vascular:  Heart Findings: Rate: Normal  Rhythm: Regular Rhythm  Sounds: Normal     Abdomen:  Abdomen Findings: Normal    Musculoskeletal:  Musculoskeletal Findings: Normal   Skin:  Skin Findings: Normal    Mental Status:  Mental Status Findings: Normal        Anesthesia Plan  Type of Anesthesia, risks & benefits discussed:  Anesthesia Type:  MAC, general, regional  Patient's Preference:   Intra-op Monitoring Plan:   Intra-op Monitoring Plan Comments:   Post Op Pain Control Plan: IV/PO Opioids PRN, multimodal analgesia and  per primary service following discharge from PACU  Post Op Pain Control Plan Comments:   Induction:   IV  Beta Blocker:  Patient is not currently on a Beta-Blocker (No further documentation required).       Informed Consent: Patient understands risks and agrees with Anesthesia plan.  Questions answered. Anesthesia consent signed with patient.  ASA Score: 1     Day of Surgery Review of History & Physical: I have interviewed and examined the patient. I have reviewed the patient's H&P dated:            Ready For Surgery From Anesthesia Perspective.

## 2020-09-09 LAB
FINAL PATHOLOGIC DIAGNOSIS: NORMAL
GROSS: NORMAL

## 2020-09-14 ENCOUNTER — OFFICE VISIT (OUTPATIENT)
Dept: ORTHOPEDICS | Facility: CLINIC | Age: 25
End: 2020-09-14
Payer: MEDICAID

## 2020-09-14 VITALS — BODY MASS INDEX: 21.97 KG/M2 | WEIGHT: 140 LBS | HEIGHT: 67 IN

## 2020-09-14 DIAGNOSIS — Z98.890 HISTORY OF EXCISION OF MASS: Primary | ICD-10-CM

## 2020-09-14 PROCEDURE — 99024 POSTOP FOLLOW-UP VISIT: CPT | Mod: ,,, | Performed by: ORTHOPAEDIC SURGERY

## 2020-09-14 PROCEDURE — 99213 OFFICE O/P EST LOW 20 MIN: CPT | Mod: PBBFAC,PN | Performed by: ORTHOPAEDIC SURGERY

## 2020-09-14 PROCEDURE — 99999 PR PBB SHADOW E&M-EST. PATIENT-LVL III: CPT | Mod: PBBFAC,,, | Performed by: ORTHOPAEDIC SURGERY

## 2020-09-14 PROCEDURE — 99024 PR POST-OP FOLLOW-UP VISIT: ICD-10-PCS | Mod: ,,, | Performed by: ORTHOPAEDIC SURGERY

## 2020-09-14 PROCEDURE — 99999 PR PBB SHADOW E&M-EST. PATIENT-LVL III: ICD-10-PCS | Mod: PBBFAC,,, | Performed by: ORTHOPAEDIC SURGERY

## 2020-09-14 NOTE — PROGRESS NOTES
"Subjective:      Patient ID: Renzo Worley is a 25 y.o. male.    Chief Complaint: Post-op Evaluation (2 wk s/p right index mass  )      HPI: Renzo Worley is here for postop visit.  He is 10 days status post excisional biopsy  of the right index finger mass.  Pathology consistent with epidermal inclusion cyst.  Went over these results with the patient today.  Overall, pain is tolerable.  Postoperative complaints include:  Swelling, stiffness, and numbness.    Past Medical History:   Diagnosis Date    ADHD     Anxiety     Concussion without loss of consciousness 2009    Right sided sciatica 8/2/2017       Current Outpatient Medications:     clotrimazole (LOTRIMIN) 1 % cream, Apply topically 2 (two) times daily. (Patient not taking: Reported on 8/14/2020), Disp: 45 g, Rfl: 1    HYDROcodone-acetaminophen (NORCO) 5-325 mg per tablet, Take 1 tablet by mouth every 4 (four) hours as needed for Pain. (Patient not taking: Reported on 9/14/2020), Disp: 20 tablet, Rfl: 0  Review of patient's allergies indicates:   Allergen Reactions    No known drug allergies        Ht 5' 7" (1.702 m)   Wt 63.5 kg (139 lb 15.9 oz)   BMI 21.93 kg/m²     Review of Systems   Constitution: Negative for chills and fever.   Cardiovascular: Negative for chest pain and palpitations.   Respiratory: Negative for shortness of breath and wheezing.    Skin: Negative for poor wound healing and rash.   Musculoskeletal: Positive for joint pain, joint swelling and stiffness.   Gastrointestinal: Negative for nausea and vomiting.   Genitourinary: Negative for dysuria and hematuria.   Neurological: Positive for numbness. Negative for seizures and tremors.   Psychiatric/Behavioral: Negative for altered mental status.   Allergic/Immunologic: Negative for environmental allergies and persistent infections.         Objective:    Ortho Exam       Right upper extremity:  Significant for chevron incision over the index finger with interrupted sutures in place. "  Wound margins well approximated.  There is no sign of infection.  Range of motion limited secondary to stiffness and swelling.  Sensation decreased.  Pulses present.  Cap refill brisk  GEN: Well developed, well nourished male. AAOX3. No acute distress.   Normocephalic, atraumatic.   JORGE  Breathing unlabored.  Mood and affect appropriate.     Assessment:     Imaging:  No new imaging        1. History of excision of mass          Plan:           Patient is progressing as expected postoperatively.  Sutures removed today without complication.  Patient tolerated well.  Wound care was discussed with regular soap and water.  Neosporin once before bed.  Light dressing was applied which can be removed later this evening.  Cover when leaving the house.  Leave uncovered while at home.  Start gentle range of motion.  Can passively extend and flex the finger without force.  If stiffness does not improve with activities and gentle ROM I would recommend occupational hand therapy.  Monitor numbness.  This may take 6 months to improve  No heavy gripping, lifting, pushing, pulling or golf.    Follow up in about 3 weeks (around 10/5/2020).

## 2020-10-08 ENCOUNTER — PATIENT MESSAGE (OUTPATIENT)
Dept: PRIMARY CARE CLINIC | Facility: CLINIC | Age: 25
End: 2020-10-08

## 2020-10-12 ENCOUNTER — OFFICE VISIT (OUTPATIENT)
Dept: ORTHOPEDICS | Facility: CLINIC | Age: 25
End: 2020-10-12
Payer: MEDICAID

## 2020-10-12 VITALS — WEIGHT: 139 LBS | BODY MASS INDEX: 21.82 KG/M2 | HEIGHT: 67 IN

## 2020-10-12 DIAGNOSIS — R22.31 MASS OF FINGER OF RIGHT HAND: Primary | ICD-10-CM

## 2020-10-12 PROCEDURE — 99024 PR POST-OP FOLLOW-UP VISIT: ICD-10-PCS | Mod: ,,, | Performed by: ORTHOPAEDIC SURGERY

## 2020-10-12 PROCEDURE — 99024 POSTOP FOLLOW-UP VISIT: CPT | Mod: ,,, | Performed by: ORTHOPAEDIC SURGERY

## 2020-10-12 PROCEDURE — 99213 OFFICE O/P EST LOW 20 MIN: CPT | Mod: PBBFAC,PN | Performed by: ORTHOPAEDIC SURGERY

## 2020-10-12 PROCEDURE — 99999 PR PBB SHADOW E&M-EST. PATIENT-LVL III: ICD-10-PCS | Mod: PBBFAC,,, | Performed by: ORTHOPAEDIC SURGERY

## 2020-10-12 PROCEDURE — 99999 PR PBB SHADOW E&M-EST. PATIENT-LVL III: CPT | Mod: PBBFAC,,, | Performed by: ORTHOPAEDIC SURGERY

## 2020-10-12 NOTE — PROGRESS NOTES
"Subjective:      Patient ID: Renzo Worley is a 25 y.o. male.  Chief Complaint: Post-op Evaluation (Right index/ Surgery 9/4)      HPI  Renzo Worley is a  25 y.o. male presenting today for post op visit.  He is s/p excision mass from the index finger 2 weeks postop.     Review of patient's allergies indicates:   Allergen Reactions    No known drug allergies          Current Outpatient Medications   Medication Sig Dispense Refill    clotrimazole (LOTRIMIN) 1 % cream Apply topically 2 (two) times daily. 45 g 1    HYDROcodone-acetaminophen (NORCO) 5-325 mg per tablet Take 1 tablet by mouth every 4 (four) hours as needed for Pain. 20 tablet 0     No current facility-administered medications for this visit.        Past Medical History:   Diagnosis Date    ADHD     Anxiety     Concussion without loss of consciousness 2009    Right sided sciatica 8/2/2017       Past Surgical History:   Procedure Laterality Date    EXCISION OF MASS OF HAND Right 9/4/2020    Procedure: EXCISION, MASS, HAND;  Surgeon: Case Luna Jr., MD;  Location: [facility];  Service: Orthopedics;  Laterality: Right;       OBJECTIVE:   PHYSICAL EXAM:  Height: 5' 7" (170.2 cm) Weight: 63 kg (139 lb)  Vitals:    10/12/20 1001   Weight: 63 kg (139 lb)   Height: 5' 7" (1.702 m)   PainSc: 0-No pain     Ortho/SPM Exam  Examination index finger the incision looks good healing well little bit of thickened range of motion slightly decreased sensation intact no infected    RADIOGRAPHS:  None  Comments: I have personally reviewed the imaging and I agree with the above radiologist's report.    ASSESSMENT/PLAN:     IMPRESSION:  Status post excision mass index finger    PLAN:  Recommended he start some warm salt water soaks gentle range of motion to help with motion  Routine wound care    FOLLOW UP:  2-3 weeks    Disclaimer: This note has been generated using voice-recognition software. There may be typographical errors that have been missed during " proof-reading.

## 2020-11-10 ENCOUNTER — OFFICE VISIT (OUTPATIENT)
Dept: ORTHOPEDICS | Facility: CLINIC | Age: 25
End: 2020-11-10
Payer: MEDICAID

## 2020-11-10 VITALS — BODY MASS INDEX: 21.8 KG/M2 | HEIGHT: 67 IN | WEIGHT: 138.88 LBS

## 2020-11-10 DIAGNOSIS — Z98.890 HISTORY OF EXCISION OF MASS: Primary | ICD-10-CM

## 2020-11-10 PROCEDURE — 99024 POSTOP FOLLOW-UP VISIT: CPT | Mod: ,,, | Performed by: ORTHOPAEDIC SURGERY

## 2020-11-10 PROCEDURE — 99212 OFFICE O/P EST SF 10 MIN: CPT | Mod: PBBFAC,PN | Performed by: ORTHOPAEDIC SURGERY

## 2020-11-10 PROCEDURE — 99999 PR PBB SHADOW E&M-EST. PATIENT-LVL II: ICD-10-PCS | Mod: PBBFAC,,, | Performed by: ORTHOPAEDIC SURGERY

## 2020-11-10 PROCEDURE — 99024 PR POST-OP FOLLOW-UP VISIT: ICD-10-PCS | Mod: ,,, | Performed by: ORTHOPAEDIC SURGERY

## 2020-11-10 PROCEDURE — 99999 PR PBB SHADOW E&M-EST. PATIENT-LVL II: CPT | Mod: PBBFAC,,, | Performed by: ORTHOPAEDIC SURGERY

## 2020-11-10 NOTE — PROGRESS NOTES
"Subjective:      Patient ID: Renzo Worley is a 25 y.o. male.    Chief Complaint: Post-op Evaluation (R index finger)      HPI: Renzo Worley is here for postop visit.  He is approximately 10 weeks status post excision mass of the right index finger.  Patient denies any pain.  Has returned to all activities daily living without difficulty.  No postop complaints.    Past Medical History:   Diagnosis Date    ADHD     Anxiety     Concussion without loss of consciousness 2009    Right sided sciatica 8/2/2017       Current Outpatient Medications:     clotrimazole (LOTRIMIN) 1 % cream, Apply topically 2 (two) times daily. (Patient not taking: Reported on 11/10/2020), Disp: 45 g, Rfl: 1    HYDROcodone-acetaminophen (NORCO) 5-325 mg per tablet, Take 1 tablet by mouth every 4 (four) hours as needed for Pain. (Patient not taking: Reported on 11/10/2020), Disp: 20 tablet, Rfl: 0  Review of patient's allergies indicates:   Allergen Reactions    No known drug allergies        Ht 5' 7" (1.702 m)   Wt 63 kg (138 lb 14.2 oz)   BMI 21.75 kg/m²     Review of Systems   Constitution: Negative for chills and fever.   Cardiovascular: Negative for chest pain and palpitations.   Respiratory: Negative for shortness of breath and wheezing.    Skin: Negative for poor wound healing and rash.   Musculoskeletal: Negative for joint pain, joint swelling and stiffness.   Gastrointestinal: Negative for nausea and vomiting.   Genitourinary: Negative for dysuria and hematuria.   Neurological: Negative for numbness, paresthesias, seizures and tremors.   Psychiatric/Behavioral: Negative for altered mental status.   Allergic/Immunologic: Negative for environmental allergies and persistent infections.         Objective:    Ortho Exam       Right upper extremity:  Well-healed incision.  No tenderness palpation.  No swelling.  Range of motion fingers full -- lacks 1-2 degrees and cyst formation of the index finger.  Sensation intact.   " strength mildly decreased.  Pulses present.  Cap refill brisk.  GEN: Well developed, well nourished male. AAOX3. No acute distress.   Normocephalic, atraumatic.   JORGE  Breathing unlabored.  Mood and affect appropriate.     Assessment:     Imaging:  No new image        1. History of excision of mass          Plan:           Patient is doing well  Continue activities as tolerated  RTC if he feels like strength is not improving; otherwise follow-up p.r.n.    Follow up if symptoms worsen or fail to improve.

## 2021-02-05 ENCOUNTER — PATIENT MESSAGE (OUTPATIENT)
Dept: DERMATOLOGY | Facility: CLINIC | Age: 26
End: 2021-02-05

## 2021-02-08 ENCOUNTER — OFFICE VISIT (OUTPATIENT)
Dept: DERMATOLOGY | Facility: CLINIC | Age: 26
End: 2021-02-08
Payer: MEDICAID

## 2021-02-08 VITALS — TEMPERATURE: 98 F

## 2021-02-08 DIAGNOSIS — D22.5 MELANOCYTIC NEVI OF TRUNK: ICD-10-CM

## 2021-02-08 DIAGNOSIS — L70.0 COMEDONAL ACNE: ICD-10-CM

## 2021-02-08 DIAGNOSIS — L72.3 INFLAMED EPIDERMOID CYST OF SKIN: Primary | ICD-10-CM

## 2021-02-08 PROCEDURE — 99204 PR OFFICE/OUTPT VISIT, NEW, LEVL IV, 45-59 MIN: ICD-10-PCS | Mod: 25,S$GLB,, | Performed by: DERMATOLOGY

## 2021-02-08 PROCEDURE — 11900 INJECT SKIN LESIONS </W 7: CPT | Mod: S$GLB,,, | Performed by: DERMATOLOGY

## 2021-02-08 PROCEDURE — 99204 OFFICE O/P NEW MOD 45 MIN: CPT | Mod: 25,S$GLB,, | Performed by: DERMATOLOGY

## 2021-02-08 PROCEDURE — 11900 PR INJECTION INTO SKIN LESIONS, UP TO 7: ICD-10-PCS | Mod: S$GLB,,, | Performed by: DERMATOLOGY

## 2021-02-08 RX ORDER — TRETINOIN 0.5 MG/G
CREAM TOPICAL
Qty: 45 G | Refills: 5 | Status: SHIPPED | OUTPATIENT
Start: 2021-02-08

## 2021-02-17 ENCOUNTER — TELEPHONE (OUTPATIENT)
Dept: PHARMACY | Facility: CLINIC | Age: 26
End: 2021-02-17

## 2021-02-22 ENCOUNTER — PATIENT MESSAGE (OUTPATIENT)
Dept: DERMATOLOGY | Facility: CLINIC | Age: 26
End: 2021-02-22

## 2021-03-12 ENCOUNTER — PATIENT MESSAGE (OUTPATIENT)
Dept: DERMATOLOGY | Facility: CLINIC | Age: 26
End: 2021-03-12

## 2021-03-15 ENCOUNTER — PROCEDURE VISIT (OUTPATIENT)
Dept: DERMATOLOGY | Facility: CLINIC | Age: 26
End: 2021-03-15
Payer: MEDICAID

## 2021-03-15 DIAGNOSIS — D48.5 NEOPLASM OF UNCERTAIN BEHAVIOR OF SKIN: Primary | ICD-10-CM

## 2021-03-15 PROCEDURE — 88304 TISSUE EXAM BY PATHOLOGIST: CPT | Mod: 26,,, | Performed by: DERMATOLOGY

## 2021-03-15 PROCEDURE — 88304 PR  SURG PATH,LEVEL III: ICD-10-PCS | Mod: 26,,, | Performed by: DERMATOLOGY

## 2021-03-15 PROCEDURE — 99499 NO LOS: ICD-10-PCS | Mod: S$GLB,,, | Performed by: DERMATOLOGY

## 2021-03-15 PROCEDURE — 11442 EXC FACE-MM B9+MARG 1.1-2 CM: CPT | Mod: S$GLB,,, | Performed by: DERMATOLOGY

## 2021-03-15 PROCEDURE — 12051 INTMD RPR FACE/MM 2.5 CM/<: CPT | Mod: 51,S$GLB,, | Performed by: DERMATOLOGY

## 2021-03-15 PROCEDURE — 11442 PR EXC SKIN BENIG 1.1-2 CM FACE,FACIAL: ICD-10-PCS | Mod: S$GLB,,, | Performed by: DERMATOLOGY

## 2021-03-15 PROCEDURE — 12051 PR INTERMED WOUND REPAIR FACE/EAR/EYELID/NOSE/LIP/MUC MEBR, 2.5CM OR LESS: ICD-10-PCS | Mod: 51,S$GLB,, | Performed by: DERMATOLOGY

## 2021-03-15 PROCEDURE — 88304 TISSUE EXAM BY PATHOLOGIST: CPT | Performed by: DERMATOLOGY

## 2021-03-15 PROCEDURE — 99499 UNLISTED E&M SERVICE: CPT | Mod: S$GLB,,, | Performed by: DERMATOLOGY

## 2021-03-18 LAB
FINAL PATHOLOGIC DIAGNOSIS: NORMAL
GROSS: NORMAL
MICROSCOPIC EXAM: NORMAL

## 2021-03-25 ENCOUNTER — CLINICAL SUPPORT (OUTPATIENT)
Dept: DERMATOLOGY | Facility: CLINIC | Age: 26
End: 2021-03-25
Payer: MEDICAID

## 2021-03-25 DIAGNOSIS — Z48.02 VISIT FOR SUTURE REMOVAL: Primary | ICD-10-CM

## 2021-03-25 PROCEDURE — 99024 POSTOP FOLLOW-UP VISIT: CPT | Mod: S$GLB,,, | Performed by: DERMATOLOGY

## 2021-03-25 PROCEDURE — 99024 PR POST-OP FOLLOW-UP VISIT: ICD-10-PCS | Mod: S$GLB,,, | Performed by: DERMATOLOGY

## 2022-01-10 NOTE — LETTER
February 26, 2018      Rene Monreal MD  [address] [place] [facility]  [address]  [facility] [address]           [facility] - Neurosurgery 7th Fl  [address]  [facility] [address]  Phone: [phone]          Patient: Renzo Worley   MR Number: [id number]   YOB: 1995   Date of Visit: 2/26/2018       Dear Dr. Rene Monreal:    Thank you for referring Renzo Worley to me for evaluation. Attached you will find relevant portions of my assessment and plan of care.    If you have questions, please do not hesitate to call me. I look forward to following Renzo Worley along with you.    Sincerely,    Lorie Ken MD    Enclosure  CC:  No Recipients    If you would like to receive this communication electronically, please contact [email or web address] or [phone] to request more information on [employer] Link access.    For providers and/or their staff who would like to refer a patient to [facility], please contact us through our one-stop-shop provider referral line, [facility], at [phone].    If you feel you have received this communication in error or would no longer like to receive these types of communications, please e-mail [email or web address]          Doxepin Counseling:  Patient advised that the medication is sedating and not to drive a car after taking this medication. Patient informed of potential adverse effects including but not limited to dry mouth, urinary retention, and blurry vision.  The patient verbalized understanding of the proper use and possible adverse effects of doxepin.  All of the patient's questions and concerns were addressed.

## 2023-06-12 ENCOUNTER — PATIENT MESSAGE (OUTPATIENT)
Dept: DERMATOLOGY | Facility: CLINIC | Age: 28
End: 2023-06-12
Payer: MEDICAID

## 2023-06-30 ENCOUNTER — OFFICE VISIT (OUTPATIENT)
Dept: DERMATOLOGY | Facility: CLINIC | Age: 28
End: 2023-06-30
Payer: COMMERCIAL

## 2023-06-30 DIAGNOSIS — L72.0 EIC (EPIDERMAL INCLUSION CYST): Primary | ICD-10-CM

## 2023-06-30 PROCEDURE — 99213 OFFICE O/P EST LOW 20 MIN: CPT | Mod: S$GLB,,, | Performed by: DERMATOLOGY

## 2023-06-30 PROCEDURE — 99213 PR OFFICE/OUTPT VISIT, EST, LEVL III, 20-29 MIN: ICD-10-PCS | Mod: S$GLB,,, | Performed by: DERMATOLOGY

## 2023-06-30 PROCEDURE — 1160F RVW MEDS BY RX/DR IN RCRD: CPT | Mod: CPTII,S$GLB,, | Performed by: DERMATOLOGY

## 2023-06-30 PROCEDURE — 1160F PR REVIEW ALL MEDS BY PRESCRIBER/CLIN PHARMACIST DOCUMENTED: ICD-10-PCS | Mod: CPTII,S$GLB,, | Performed by: DERMATOLOGY

## 2023-06-30 PROCEDURE — 1159F MED LIST DOCD IN RCRD: CPT | Mod: CPTII,S$GLB,, | Performed by: DERMATOLOGY

## 2023-06-30 PROCEDURE — 1159F PR MEDICATION LIST DOCUMENTED IN MEDICAL RECORD: ICD-10-PCS | Mod: CPTII,S$GLB,, | Performed by: DERMATOLOGY

## 2023-06-30 NOTE — PROGRESS NOTES
Patient Information  Name: Renzo Worley  : 1995  MRN: [id number]     Referring Physician:  No ref. provider found   Primary Care Physician:  Primary Doctor No   Date of Visit: 2023      Subjective:     History of Present lllness:    Renzo Worley is a 28 y.o. male who presents with a chief complaint of bump under skin.    Location: back  Duration: months  Signs/Symptoms: bump under skin, growing, tender if he leans back on it  Relieving factors/Prior treatments: none  He did well with previous cyst removal on ear; would like this one removed as well.    Patient was last seen: 3/15/2021.  Prior notes by myself reviewed.   Clinical documentation obtained by nursing staff reviewed.    Review of Systems    Objective:   Physical Exam   Constitutional: He appears well-developed and well-nourished. No distress.   Neurological: He is alert and oriented to person, place, and time. He is not disoriented.   Psychiatric: He has a normal mood and affect.   Skin:   Areas Examined (abnormalities noted in diagram):   Back Inspection Performed          Diagram Legend     Erythematous scaling macule/papule c/w actinic keratosis       Vascular papule c/w angioma      Pigmented verrucoid papule/plaque c/w seborrheic keratosis      Yellow umbilicated papule c/w sebaceous hyperplasia      Irregularly shaped tan macule c/w lentigo     1-2 mm smooth white papules consistent with Milia      Movable subcutaneous cyst with punctum c/w epidermal inclusion cyst      Subcutaneous movable cyst c/w pilar cyst      Firm pink to brown papule c/w dermatofibroma      Pedunculated fleshy papule(s) c/w skin tag(s)      Evenly pigmented macule c/w junctional nevus     Mildly variegated pigmented, slightly irregular-bordered macule c/w mildly atypical nevus      Flesh colored to evenly pigmented papule c/w intradermal nevus       Pink pearly papule/plaque c/w basal cell carcinoma      Erythematous hyperkeratotic cursted plaque c/w SCC       Surgical scar with no sign of skin cancer recurrence      Open and closed comedones      Inflammatory papules and pustules      Verrucoid papule consistent consistent with wart     Erythematous eczematous patches and plaques     Dystrophic onycholytic nail with subungual debris c/w onychomycosis     Umbilicated papule    Erythematous-base heme-crusted tan verrucoid plaque consistent with inflamed seborrheic keratosis     Erythematous Silvery Scaling Plaque c/w Psoriasis     See annotation    No images are attached to the encounter or orders placed in the encounter.      [] Data reviewed  [] Prior external notes reviewed  [] Independent review of test  [] Management discussed with another provider  [] Independent historian    Assessment / Plan:        EIC (epidermal inclusion cyst)  This is a benign cyst of the hair follicle. Reassurance provided. No treatment is necessary unless it is symptomatic.   Discussed risks, benefits, and alternatives of excision, including but not limited to scarring (including hypertrophic scar vs keloid given location), bleeding, infection, recurrence, and need for additional treatment of site.      Follow up for surgery if desired.      Nirmala Owens MD, FAAD  [facility]

## 2024-05-18 ENCOUNTER — OFFICE VISIT (OUTPATIENT)
Dept: URGENT CARE | Facility: CLINIC | Age: 29
End: 2024-05-18

## 2024-05-18 VITALS
OXYGEN SATURATION: 97 % | RESPIRATION RATE: 20 BRPM | SYSTOLIC BLOOD PRESSURE: 106 MMHG | BODY MASS INDEX: 21.97 KG/M2 | HEART RATE: 61 BPM | DIASTOLIC BLOOD PRESSURE: 71 MMHG | WEIGHT: 140 LBS | TEMPERATURE: 97 F | HEIGHT: 67 IN

## 2024-05-18 DIAGNOSIS — S50.02XA CONTUSION OF LEFT ELBOW, INITIAL ENCOUNTER: ICD-10-CM

## 2024-05-18 DIAGNOSIS — S20.20XA TRAUMATIC ECCHYMOSIS OF CHEST, INITIAL ENCOUNTER: ICD-10-CM
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
## 2024-05-18 PROCEDURE — 99214 OFFICE O/P EST MOD 30 MIN: CPT | Mod: TIER,S$GLB,, | Performed by: FAMILY MEDICINE

## 2024-05-18 NOTE — PATIENT INSTRUCTIONS
Rest, Ice, Compress, Elevate  Tylenol/ ibuprofen if needed  I do not suspect fracture: no X-ray of elbow at this time, should elbow pain not improve in 7 days or if worsening pain will need reevaluation + Xray  No wounds or abrasions- tetanus vaccine declined, this is reasonable    Follow up with a primary care provider in 3-5 days.  Seek immediate care in the emergency room in the event of severe headache, abdominal pain, chest pain, respiratory distress, fever unresponsive to antipyretic, dehydration, loss of consciousness, seizure.
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
## 2024-05-31 ENCOUNTER — TELEPHONE (OUTPATIENT)
Dept: PRIMARY CARE CLINIC | Facility: CLINIC | Age: 29
End: 2024-05-31
Payer: MEDICAID

## 2024-05-31 ENCOUNTER — TELEPHONE (OUTPATIENT)
Dept: ORTHOPEDICS | Facility: CLINIC | Age: 29
End: 2024-05-31
Payer: MEDICAID

## 2024-05-31 NOTE — TELEPHONE ENCOUNTER
----- Message from Fabiano Coburn sent at 5/30/2024  5:00 PM CDT -----  Regarding: PT'S MOM IS REQUESTING A CALL BACK TO [facility] APPT WITH PROVIDER  Contact: pt  Pt is needing new pt appt .    Confirmed contact info below:  Contact Name: Renzo Worley  Phone Number: [phone]

## 2024-05-31 NOTE — TELEPHONE ENCOUNTER
Spoke with patients mom. She was advise that [facility] doesn't see. Back, neck or spine. Mom also advise to call medicaid to see where he can go. Mom verbalized understanding.

## 2024-05-31 NOTE — TELEPHONE ENCOUNTER
----- Message from Fabiano Coburn sent at 5/30/2024  4:57 PM CDT -----  Regarding: PT'S MOM IS REQUESTING A CALL BACK TO SCHEDULE FOR BACK PAIN/ PREVIOUS PT OF DR MACHUCA  Contact: pt  Confirmed contact info below:  Contact Name: Renzo Worley  Phone Number: [phone]

## (undated) DEVICE — DRAPE OPMI STERILE

## (undated) DEVICE — SET DECANTER MEDICHOICE

## (undated) DEVICE — PACK BASIC

## (undated) DEVICE — CLOSURE SKIN STERI STRIP 1/2X4

## (undated) DEVICE — SEE L#120831

## (undated) DEVICE — DURAPREP SURG SCRUB 26ML

## (undated) DEVICE — SEE MEDLINE ITEM 157131

## (undated) DEVICE — DRESSING AQUACEL SACRAL 9 X 9

## (undated) DEVICE — SUT ETHILON 5-0 PS-2 18IN

## (undated) DEVICE — BLADE SCALP OPHTL BEVEL STR

## (undated) DEVICE — SUT MCRYL PLUS 4-0 PS2 27IN

## (undated) DEVICE — CATH SUCTION 10FR

## (undated) DEVICE — DRESSING SURGICAL 1/2X1/2

## (undated) DEVICE — SEE MEDLINE ITEM 157116

## (undated) DEVICE — DRESSING AQUACEL FOAM 5 X 5

## (undated) DEVICE — SUT 0 VICRYL / UR6 (J603)

## (undated) DEVICE — ELECTRODE REM PLYHSV RETURN 9

## (undated) DEVICE — NDL HYPO REG 25G X 1 1/2

## (undated) DEVICE — SUT CTD VICRYL 3-0 CR/SH

## (undated) DEVICE — STOCKINET 4INX48

## (undated) DEVICE — MANIFOLD 4 PORT

## (undated) DEVICE — BLADE ELECTRO EDGE INSULATED

## (undated) DEVICE — BUR BONE CUT MICRO TPS 3X3.8MM

## (undated) DEVICE — PAD CAST 2 IN X 4YDS STERILE

## (undated) DEVICE — DRESSING TELFA PAD N ADH 2X3

## (undated) DEVICE — SEE MEDLINE ITEM 152622

## (undated) DEVICE — CLIPPER BLADE MOD 4406 (CAREF)

## (undated) DEVICE — DRAPE STERI INSTRUMENT 1018

## (undated) DEVICE — KIT SURGIFLO EVITHROM

## (undated) DEVICE — TRAY FOLEY 16FR INFECTION CONT

## (undated) DEVICE — PAD PREP 50/CA

## (undated) DEVICE — DRAPE C-ARMOR EQUIPMENT COVER

## (undated) DEVICE — GAUZE SPONGE 4X4 12PLY

## (undated) DEVICE — DRAPE STERI-DRAPE 1000 17X11IN

## (undated) DEVICE — SEE MEDLINE ITEM 157117

## (undated) DEVICE — BANDAGE ADHESIVE

## (undated) DEVICE — DRESSING MEPILEX BORDER 4 X 4

## (undated) DEVICE — DRAPE INCISE IOBAN 2 23X17IN

## (undated) DEVICE — GLOVE SURG BIOGEL LATEX SZ 7.5

## (undated) DEVICE — DRESSING XEROFORM FOIL PK 1X8

## (undated) DEVICE — COVER OVERHEAD SURG LT BLUE

## (undated) DEVICE — KIT SPINAL PATIENT CARE JACK

## (undated) DEVICE — SUT D SPECIAL VICRYL 2-0

## (undated) DEVICE — DRESSING TELFA STRL 4X3 LF

## (undated) DEVICE — SEE MEDLINE ITEM 152522

## (undated) DEVICE — SEE MEDLINE ITEM 156905

## (undated) DEVICE — SEE MEDLINE ITEM 157173

## (undated) DEVICE — DRESSING TRANS 4X4 TEGADERM

## (undated) DEVICE — NDL SPINAL 18GX3.5 SPINOCAN

## (undated) DEVICE — TUBE FRAZIER 5MM 2FT SOFT TIP

## (undated) DEVICE — CORD BIPOLAR 12 FOOT

## (undated) DEVICE — DRAPE ABDOMINAL TIBURON 14X11

## (undated) DEVICE — DIFFUSER

## (undated) DEVICE — BANDAGE ELASTIC 2X5 VELCRO ST

## (undated) DEVICE — CARTRIDGE OIL

## (undated) DEVICE — DRAPE C ARM 42 X 120 10/BX

## (undated) DEVICE — MARKER SKIN STND TIP BLUE BARR

## (undated) DEVICE — APPLICATOR CHLORAPREP ORN 26ML

## (undated) DEVICE — DRESSING TEGADERM 2X2 3/4

## (undated) DEVICE — SEE MEDLINE ITEM 157150

## (undated) DEVICE — BANDAGE SOFFORM STER 2IN

## (undated) DEVICE — SEE MEDLINE ITEM 156955